# Patient Record
Sex: MALE | Race: WHITE | URBAN - METROPOLITAN AREA
[De-identification: names, ages, dates, MRNs, and addresses within clinical notes are randomized per-mention and may not be internally consistent; named-entity substitution may affect disease eponyms.]

---

## 2017-04-06 ENCOUNTER — EMERGENCY (EMERGENCY)
Facility: HOSPITAL | Age: 67
LOS: 1 days | Discharge: ANOTHER HOSPITAL | End: 2017-04-06
Attending: EMERGENCY MEDICINE | Admitting: EMERGENCY MEDICINE
Payer: MEDICARE

## 2017-04-06 ENCOUNTER — INPATIENT (INPATIENT)
Facility: HOSPITAL | Age: 67
LOS: 2 days | Discharge: HOME | DRG: 247 | End: 2017-04-09
Attending: STUDENT IN AN ORGANIZED HEALTH CARE EDUCATION/TRAINING PROGRAM | Admitting: INTERNAL MEDICINE
Payer: MEDICARE

## 2017-04-06 VITALS
WEIGHT: 300.05 LBS | HEIGHT: 69 IN | DIASTOLIC BLOOD PRESSURE: 95 MMHG | SYSTOLIC BLOOD PRESSURE: 150 MMHG | HEART RATE: 84 BPM | RESPIRATION RATE: 20 BRPM | TEMPERATURE: 98 F | OXYGEN SATURATION: 100 %

## 2017-04-06 VITALS — RESPIRATION RATE: 18 BRPM | HEART RATE: 99 BPM | WEIGHT: 292.99 LBS | HEIGHT: 69 IN | TEMPERATURE: 101 F

## 2017-04-06 VITALS
OXYGEN SATURATION: 100 % | RESPIRATION RATE: 19 BRPM | SYSTOLIC BLOOD PRESSURE: 123 MMHG | TEMPERATURE: 98 F | HEART RATE: 77 BPM | DIASTOLIC BLOOD PRESSURE: 83 MMHG

## 2017-04-06 DIAGNOSIS — I10 ESSENTIAL (PRIMARY) HYPERTENSION: ICD-10-CM

## 2017-04-06 DIAGNOSIS — I21.3 ST ELEVATION (STEMI) MYOCARDIAL INFARCTION OF UNSPECIFIED SITE: ICD-10-CM

## 2017-04-06 DIAGNOSIS — R07.9 CHEST PAIN, UNSPECIFIED: ICD-10-CM

## 2017-04-06 DIAGNOSIS — F32.9 MAJOR DEPRESSIVE DISORDER, SINGLE EPISODE, UNSPECIFIED: ICD-10-CM

## 2017-04-06 DIAGNOSIS — Z41.8 ENCOUNTER FOR OTHER PROCEDURES FOR PURPOSES OTHER THAN REMEDYING HEALTH STATE: ICD-10-CM

## 2017-04-06 DIAGNOSIS — I21.02 ST ELEVATION (STEMI) MYOCARDIAL INFARCTION INVOLVING LEFT ANTERIOR DESCENDING CORONARY ARTERY: ICD-10-CM

## 2017-04-06 DIAGNOSIS — R65.10 SYSTEMIC INFLAMMATORY RESPONSE SYNDROME (SIRS) OF NON-INFECTIOUS ORIGIN WITHOUT ACUTE ORGAN DYSFUNCTION: ICD-10-CM

## 2017-04-06 DIAGNOSIS — R07.89 OTHER CHEST PAIN: ICD-10-CM

## 2017-04-06 DIAGNOSIS — M10.9 GOUT, UNSPECIFIED: ICD-10-CM

## 2017-04-06 DIAGNOSIS — E11.9 TYPE 2 DIABETES MELLITUS WITHOUT COMPLICATIONS: ICD-10-CM

## 2017-04-06 DIAGNOSIS — I63.9 CEREBRAL INFARCTION, UNSPECIFIED: ICD-10-CM

## 2017-04-06 LAB
ALBUMIN SERPL ELPH-MCNC: 3.5 G/DL — SIGNIFICANT CHANGE UP (ref 3.3–5)
ALBUMIN SERPL ELPH-MCNC: 3.7 G/DL — SIGNIFICANT CHANGE UP (ref 3.3–5)
ALP SERPL-CCNC: 78 U/L — SIGNIFICANT CHANGE UP (ref 30–120)
ALP SERPL-CCNC: 84 U/L — SIGNIFICANT CHANGE UP (ref 40–120)
ALT FLD-CCNC: 40 U/L RC — SIGNIFICANT CHANGE UP (ref 10–45)
ALT FLD-CCNC: 53 U/L DA — SIGNIFICANT CHANGE UP (ref 10–60)
ANION GAP SERPL CALC-SCNC: 20 MMOL/L — HIGH (ref 5–17)
ANION GAP SERPL CALC-SCNC: 3 MMOL/L — LOW (ref 5–17)
APPEARANCE UR: CLEAR — SIGNIFICANT CHANGE UP
APTT BLD: 131.6 SEC — CRITICAL HIGH (ref 27.5–37.4)
APTT BLD: 33.1 SEC — SIGNIFICANT CHANGE UP (ref 27.5–37.4)
APTT BLD: 34.5 SEC — SIGNIFICANT CHANGE UP (ref 27.5–37.4)
AST SERPL-CCNC: 131 U/L — HIGH (ref 10–40)
AST SERPL-CCNC: 158 U/L — HIGH (ref 10–40)
BASOPHILS # BLD AUTO: 0 K/UL — SIGNIFICANT CHANGE UP (ref 0–0.2)
BASOPHILS # BLD AUTO: 0.2 K/UL — SIGNIFICANT CHANGE UP (ref 0–0.2)
BASOPHILS NFR BLD AUTO: 0 % — SIGNIFICANT CHANGE UP (ref 0–2)
BILIRUB SERPL-MCNC: 2.4 MG/DL — HIGH (ref 0.2–1.2)
BILIRUB SERPL-MCNC: 2.4 MG/DL — HIGH (ref 0.2–1.2)
BILIRUB UR-MCNC: NEGATIVE — SIGNIFICANT CHANGE UP
BUN SERPL-MCNC: 15 MG/DL — SIGNIFICANT CHANGE UP (ref 7–23)
BUN SERPL-MCNC: 17 MG/DL — SIGNIFICANT CHANGE UP (ref 7–23)
CALCIUM SERPL-MCNC: 9.1 MG/DL — SIGNIFICANT CHANGE UP (ref 8.4–10.5)
CALCIUM SERPL-MCNC: 9.4 MG/DL — SIGNIFICANT CHANGE UP (ref 8.4–10.5)
CHLORIDE SERPL-SCNC: 97 MMOL/L — SIGNIFICANT CHANGE UP (ref 96–108)
CHLORIDE SERPL-SCNC: 98 MMOL/L — SIGNIFICANT CHANGE UP (ref 96–108)
CK MB BLD-MCNC: 7 % — HIGH (ref 0–3.5)
CK MB BLD-MCNC: 7.4 % — HIGH (ref 0–3.5)
CK MB CFR SERPL CALC: 42 NG/ML — HIGH (ref 0–3.6)
CK MB CFR SERPL CALC: 52 NG/ML — HIGH (ref 0–6.7)
CK SERPL-CCNC: 571 U/L — HIGH (ref 39–308)
CK SERPL-CCNC: 746 U/L — HIGH (ref 30–200)
CO2 SERPL-SCNC: 20 MMOL/L — LOW (ref 22–31)
CO2 SERPL-SCNC: 33 MMOL/L — HIGH (ref 22–31)
COLOR SPEC: COLORLESS — SIGNIFICANT CHANGE UP
CREAT SERPL-MCNC: 0.93 MG/DL — SIGNIFICANT CHANGE UP (ref 0.5–1.3)
CREAT SERPL-MCNC: 1.09 MG/DL — SIGNIFICANT CHANGE UP (ref 0.5–1.3)
DIFF PNL FLD: ABNORMAL
EOSINOPHIL # BLD AUTO: 0 K/UL — SIGNIFICANT CHANGE UP (ref 0–0.5)
EOSINOPHIL # BLD AUTO: 0.1 K/UL — SIGNIFICANT CHANGE UP (ref 0–0.5)
EOSINOPHIL NFR BLD AUTO: 0.1 % — SIGNIFICANT CHANGE UP (ref 0–6)
EOSINOPHIL NFR BLD AUTO: 1 % — SIGNIFICANT CHANGE UP (ref 0–6)
GLUCOSE SERPL-MCNC: 126 MG/DL — HIGH (ref 70–99)
GLUCOSE SERPL-MCNC: 135 MG/DL — HIGH (ref 70–99)
GLUCOSE UR QL: 1000
HBA1C BLD-MCNC: 7.6 % — HIGH (ref 4–5.6)
HCT VFR BLD CALC: 47.7 % — SIGNIFICANT CHANGE UP (ref 39–50)
HCT VFR BLD CALC: 51.3 % — HIGH (ref 39–50)
HGB BLD-MCNC: 16.1 G/DL — SIGNIFICANT CHANGE UP (ref 13–17)
HGB BLD-MCNC: 16.2 G/DL — SIGNIFICANT CHANGE UP (ref 13–17)
INR BLD: 1.29 RATIO — HIGH (ref 0.88–1.16)
INR BLD: 1.34 RATIO — HIGH (ref 0.88–1.16)
KETONES UR-MCNC: ABNORMAL
LEUKOCYTE ESTERASE UR-ACNC: NEGATIVE — SIGNIFICANT CHANGE UP
LYMPHOCYTES # BLD AUTO: 1.8 K/UL — SIGNIFICANT CHANGE UP (ref 1–3.3)
LYMPHOCYTES # BLD AUTO: 11.6 % — LOW (ref 13–44)
LYMPHOCYTES # BLD AUTO: 17 % — SIGNIFICANT CHANGE UP (ref 13–44)
LYMPHOCYTES # BLD AUTO: 2.9 K/UL — SIGNIFICANT CHANGE UP (ref 1–3.3)
MAGNESIUM SERPL-MCNC: 1.9 MG/DL — SIGNIFICANT CHANGE UP (ref 1.6–2.6)
MCHC RBC-ENTMCNC: 29.1 PG — SIGNIFICANT CHANGE UP (ref 27–34)
MCHC RBC-ENTMCNC: 30.2 PG — SIGNIFICANT CHANGE UP (ref 27–34)
MCHC RBC-ENTMCNC: 31.6 GM/DL — LOW (ref 32–36)
MCHC RBC-ENTMCNC: 33.7 GM/DL — SIGNIFICANT CHANGE UP (ref 32–36)
MCV RBC AUTO: 89.7 FL — SIGNIFICANT CHANGE UP (ref 80–100)
MCV RBC AUTO: 92 FL — SIGNIFICANT CHANGE UP (ref 80–100)
MONOCYTES # BLD AUTO: 1.4 K/UL — HIGH (ref 0–0.9)
MONOCYTES # BLD AUTO: 1.6 K/UL — HIGH (ref 0–0.9)
MONOCYTES NFR BLD AUTO: 7 % — SIGNIFICANT CHANGE UP (ref 2–14)
MONOCYTES NFR BLD AUTO: 8.5 % — SIGNIFICANT CHANGE UP (ref 2–14)
NEUTROPHILS # BLD AUTO: 10.1 K/UL — HIGH (ref 1.8–7.4)
NEUTROPHILS # BLD AUTO: 12.8 K/UL — HIGH (ref 1.8–7.4)
NEUTROPHILS NFR BLD AUTO: 75 % — SIGNIFICANT CHANGE UP (ref 43–77)
NEUTROPHILS NFR BLD AUTO: 79.8 % — HIGH (ref 43–77)
NITRITE UR-MCNC: NEGATIVE — SIGNIFICANT CHANGE UP
NT-PROBNP SERPL-SCNC: 3457 PG/ML — HIGH (ref 0–300)
PH UR: 6.5 — SIGNIFICANT CHANGE UP (ref 4.8–8)
PHOSPHATE SERPL-MCNC: 2.5 MG/DL — SIGNIFICANT CHANGE UP (ref 2.5–4.5)
PLATELET # BLD AUTO: 163 K/UL — SIGNIFICANT CHANGE UP (ref 150–400)
PLATELET # BLD AUTO: 178 K/UL — SIGNIFICANT CHANGE UP (ref 150–400)
POTASSIUM SERPL-MCNC: 3.9 MMOL/L — SIGNIFICANT CHANGE UP (ref 3.5–5.3)
POTASSIUM SERPL-MCNC: 4.2 MMOL/L — SIGNIFICANT CHANGE UP (ref 3.5–5.3)
POTASSIUM SERPL-SCNC: 3.9 MMOL/L — SIGNIFICANT CHANGE UP (ref 3.5–5.3)
POTASSIUM SERPL-SCNC: 4.2 MMOL/L — SIGNIFICANT CHANGE UP (ref 3.5–5.3)
PROT SERPL-MCNC: 7.4 G/DL — SIGNIFICANT CHANGE UP (ref 6–8.3)
PROT SERPL-MCNC: 7.6 G/DL — SIGNIFICANT CHANGE UP (ref 6–8.3)
PROT UR-MCNC: NEGATIVE — SIGNIFICANT CHANGE UP
PROTHROM AB SERPL-ACNC: 14.1 SEC — HIGH (ref 9.8–12.7)
PROTHROM AB SERPL-ACNC: 14.7 SEC — HIGH (ref 9.8–12.7)
RAPID RVP RESULT: SIGNIFICANT CHANGE UP
RBC # BLD: 5.32 M/UL — SIGNIFICANT CHANGE UP (ref 4.2–5.8)
RBC # BLD: 5.58 M/UL — SIGNIFICANT CHANGE UP (ref 4.2–5.8)
RBC # FLD: 13.8 % — SIGNIFICANT CHANGE UP (ref 10.3–14.5)
RBC # FLD: 13.9 % — SIGNIFICANT CHANGE UP (ref 10.3–14.5)
SODIUM SERPL-SCNC: 134 MMOL/L — LOW (ref 135–145)
SODIUM SERPL-SCNC: 137 MMOL/L — SIGNIFICANT CHANGE UP (ref 135–145)
SP GR SPEC: >1.03 — HIGH (ref 1.01–1.02)
TROPONIN I SERPL-MCNC: 18.49 NG/ML — HIGH (ref 0.02–0.06)
TROPONIN T SERPL-MCNC: 5.37 NG/ML — HIGH (ref 0–0.06)
TSH SERPL-MCNC: 2.45 UIU/ML — SIGNIFICANT CHANGE UP (ref 0.27–4.2)
UROBILINOGEN FLD QL: NEGATIVE — SIGNIFICANT CHANGE UP
WBC # BLD: 14.7 K/UL — HIGH (ref 3.8–10.5)
WBC # BLD: 16 K/UL — HIGH (ref 3.8–10.5)
WBC # FLD AUTO: 14.7 K/UL — HIGH (ref 3.8–10.5)
WBC # FLD AUTO: 16 K/UL — HIGH (ref 3.8–10.5)

## 2017-04-06 PROCEDURE — 99285 EMERGENCY DEPT VISIT HI MDM: CPT

## 2017-04-06 PROCEDURE — 71010: CPT | Mod: 26,77

## 2017-04-06 PROCEDURE — 71010: CPT | Mod: 26

## 2017-04-06 PROCEDURE — 92941 PRQ TRLML REVSC TOT OCCL AMI: CPT | Mod: LD,GC

## 2017-04-06 PROCEDURE — 93567 NJX CAR CTH SPRVLV AORTGRPHY: CPT | Mod: GC

## 2017-04-06 PROCEDURE — 93010 ELECTROCARDIOGRAM REPORT: CPT

## 2017-04-06 PROCEDURE — 93458 L HRT ARTERY/VENTRICLE ANGIO: CPT | Mod: 26,59,GC

## 2017-04-06 RX ORDER — ATORVASTATIN CALCIUM 80 MG/1
80 TABLET, FILM COATED ORAL AT BEDTIME
Qty: 0 | Refills: 0 | Status: DISCONTINUED | OUTPATIENT
Start: 2017-04-06 | End: 2017-04-09

## 2017-04-06 RX ORDER — HEPARIN SODIUM 5000 [USP'U]/ML
5000 INJECTION INTRAVENOUS; SUBCUTANEOUS ONCE
Qty: 0 | Refills: 0 | Status: COMPLETED | OUTPATIENT
Start: 2017-04-06 | End: 2017-04-06

## 2017-04-06 RX ORDER — SODIUM CHLORIDE 9 MG/ML
1000 INJECTION, SOLUTION INTRAVENOUS
Qty: 0 | Refills: 0 | Status: DISCONTINUED | OUTPATIENT
Start: 2017-04-06 | End: 2017-04-09

## 2017-04-06 RX ORDER — FUROSEMIDE 40 MG
40 TABLET ORAL ONCE
Qty: 0 | Refills: 0 | Status: COMPLETED | OUTPATIENT
Start: 2017-04-06 | End: 2017-04-06

## 2017-04-06 RX ORDER — ALLOPURINOL 300 MG
300 TABLET ORAL DAILY
Qty: 0 | Refills: 0 | Status: DISCONTINUED | OUTPATIENT
Start: 2017-04-06 | End: 2017-04-09

## 2017-04-06 RX ORDER — HEPARIN SODIUM 5000 [USP'U]/ML
5000 INJECTION INTRAVENOUS; SUBCUTANEOUS ONCE
Qty: 0 | Refills: 0 | Status: DISCONTINUED | OUTPATIENT
Start: 2017-04-06 | End: 2017-04-06

## 2017-04-06 RX ORDER — INSULIN LISPRO 100/ML
VIAL (ML) SUBCUTANEOUS AT BEDTIME
Qty: 0 | Refills: 0 | Status: DISCONTINUED | OUTPATIENT
Start: 2017-04-06 | End: 2017-04-09

## 2017-04-06 RX ORDER — ESCITALOPRAM OXALATE 10 MG/1
20 TABLET, FILM COATED ORAL DAILY
Qty: 0 | Refills: 0 | Status: DISCONTINUED | OUTPATIENT
Start: 2017-04-06 | End: 2017-04-09

## 2017-04-06 RX ORDER — ASPIRIN/CALCIUM CARB/MAGNESIUM 324 MG
325 TABLET ORAL ONCE
Qty: 0 | Refills: 0 | Status: COMPLETED | OUTPATIENT
Start: 2017-04-06 | End: 2017-04-06

## 2017-04-06 RX ORDER — DEXTROSE 50 % IN WATER 50 %
25 SYRINGE (ML) INTRAVENOUS ONCE
Qty: 0 | Refills: 0 | Status: DISCONTINUED | OUTPATIENT
Start: 2017-04-06 | End: 2017-04-09

## 2017-04-06 RX ORDER — HEPARIN SODIUM 5000 [USP'U]/ML
INJECTION INTRAVENOUS; SUBCUTANEOUS
Qty: 25000 | Refills: 0 | Status: DISCONTINUED | OUTPATIENT
Start: 2017-04-06 | End: 2017-04-10

## 2017-04-06 RX ORDER — DEXTROSE 50 % IN WATER 50 %
12.5 SYRINGE (ML) INTRAVENOUS ONCE
Qty: 0 | Refills: 0 | Status: DISCONTINUED | OUTPATIENT
Start: 2017-04-06 | End: 2017-04-09

## 2017-04-06 RX ORDER — CLOPIDOGREL BISULFATE 75 MG/1
75 TABLET, FILM COATED ORAL DAILY
Qty: 0 | Refills: 0 | Status: DISCONTINUED | OUTPATIENT
Start: 2017-04-07 | End: 2017-04-09

## 2017-04-06 RX ORDER — INSULIN LISPRO 100/ML
VIAL (ML) SUBCUTANEOUS
Qty: 0 | Refills: 0 | Status: DISCONTINUED | OUTPATIENT
Start: 2017-04-06 | End: 2017-04-09

## 2017-04-06 RX ORDER — MAGNESIUM SULFATE 500 MG/ML
1 VIAL (ML) INJECTION ONCE
Qty: 0 | Refills: 0 | Status: COMPLETED | OUTPATIENT
Start: 2017-04-06 | End: 2017-04-06

## 2017-04-06 RX ORDER — LISINOPRIL 2.5 MG/1
5 TABLET ORAL DAILY
Qty: 0 | Refills: 0 | Status: DISCONTINUED | OUTPATIENT
Start: 2017-04-06 | End: 2017-04-07

## 2017-04-06 RX ORDER — RIVASTIGMINE 4.6 MG/24H
9.5 PATCH, EXTENDED RELEASE TRANSDERMAL
Qty: 0 | Refills: 0 | COMMUNITY

## 2017-04-06 RX ORDER — PANTOPRAZOLE SODIUM 20 MG/1
40 TABLET, DELAYED RELEASE ORAL
Qty: 0 | Refills: 0 | Status: DISCONTINUED | OUTPATIENT
Start: 2017-04-06 | End: 2017-04-09

## 2017-04-06 RX ORDER — GLUCAGON INJECTION, SOLUTION 0.5 MG/.1ML
1 INJECTION, SOLUTION SUBCUTANEOUS ONCE
Qty: 0 | Refills: 0 | Status: DISCONTINUED | OUTPATIENT
Start: 2017-04-06 | End: 2017-04-09

## 2017-04-06 RX ORDER — ASPIRIN/CALCIUM CARB/MAGNESIUM 324 MG
81 TABLET ORAL DAILY
Qty: 0 | Refills: 0 | Status: DISCONTINUED | OUTPATIENT
Start: 2017-04-07 | End: 2017-04-09

## 2017-04-06 RX ORDER — DEXTROSE 50 % IN WATER 50 %
1 SYRINGE (ML) INTRAVENOUS ONCE
Qty: 0 | Refills: 0 | Status: DISCONTINUED | OUTPATIENT
Start: 2017-04-06 | End: 2017-04-09

## 2017-04-06 RX ORDER — HEPARIN SODIUM 5000 [USP'U]/ML
5000 INJECTION INTRAVENOUS; SUBCUTANEOUS EVERY 12 HOURS
Qty: 0 | Refills: 0 | Status: DISCONTINUED | OUTPATIENT
Start: 2017-04-06 | End: 2017-04-09

## 2017-04-06 RX ORDER — CLOPIDOGREL BISULFATE 75 MG/1
600 TABLET, FILM COATED ORAL ONCE
Qty: 0 | Refills: 0 | Status: COMPLETED | OUTPATIENT
Start: 2017-04-06 | End: 2017-04-06

## 2017-04-06 RX ORDER — GABAPENTIN 400 MG/1
300 CAPSULE ORAL
Qty: 0 | Refills: 0 | Status: DISCONTINUED | OUTPATIENT
Start: 2017-04-06 | End: 2017-04-09

## 2017-04-06 RX ADMIN — ATORVASTATIN CALCIUM 80 MILLIGRAM(S): 80 TABLET, FILM COATED ORAL at 21:24

## 2017-04-06 RX ADMIN — HEPARIN SODIUM 5000 UNIT(S): 5000 INJECTION INTRAVENOUS; SUBCUTANEOUS at 12:16

## 2017-04-06 RX ADMIN — Medication 100 GRAM(S): at 21:33

## 2017-04-06 RX ADMIN — Medication 40 MILLIGRAM(S): at 20:15

## 2017-04-06 RX ADMIN — HEPARIN SODIUM 5000 UNIT(S): 5000 INJECTION INTRAVENOUS; SUBCUTANEOUS at 23:10

## 2017-04-06 RX ADMIN — Medication 325 MILLIGRAM(S): at 12:03

## 2017-04-06 RX ADMIN — LISINOPRIL 5 MILLIGRAM(S): 2.5 TABLET ORAL at 16:06

## 2017-04-06 RX ADMIN — CLOPIDOGREL BISULFATE 600 MILLIGRAM(S): 75 TABLET, FILM COATED ORAL at 12:03

## 2017-04-06 RX ADMIN — Medication 300 MILLIGRAM(S): at 21:23

## 2017-04-06 RX ADMIN — GABAPENTIN 300 MILLIGRAM(S): 400 CAPSULE ORAL at 18:32

## 2017-04-06 RX ADMIN — PANTOPRAZOLE SODIUM 40 MILLIGRAM(S): 20 TABLET, DELAYED RELEASE ORAL at 16:06

## 2017-04-06 NOTE — H&P ADULT. - PROBLEM SELECTOR PLAN 4
- F/u A1C  - Pt not on insulin. will hold oral agents. Start ISS.   - Monitor FS premeal and before bed  - CC diet

## 2017-04-06 NOTE — H&P ADULT. - ASSESSMENT
67M with a PMH of HTN, DM2, HLD, CVA (at 51) that presents with substernal chest pain found to have LUTHER on EKG. Transferred for cardiac cath. Found to have 100% LAD lesion now s/p 3 KEITH to Mid to Distal LAD.

## 2017-04-06 NOTE — H&P ADULT. - HISTORY OF PRESENT ILLNESS
Pt is a 67 year old M with a PMH of HTN, BPH, DM2, HLD, depression, CVA (at 51) reoprtadly c/b memory loss w/ no physical deficits, gout, and remote hx of appendicitis c/b peritonitis requiring multiple surgeries, and remote hx of psoriasis that presents with substernal burning / crushing chest pain. He states that the pain started the night before presentation. It came on suddenly after eating, so he thought it was heartburn. The next day his son saw him and urged him to go to the doctor. He went to his PCP, who reportedly took a ECG and then told him to go to the ER immediately. He was taken to [place] ED. ECG was performed and showed approximately 3 mm LUTHER in V1, V2, and V3. Q waves were also noted in II, III, and aVF. He was given ASA, Plavix and heparin. Cardiac enzymes were positive. The patient was transferred to [facility] for cardiac cath for STEMI.     Pt was taken to the cath lab on arrival. He was found to have LVEDP of 25, preserved EF, and 100% mid LAD lesion. He received 40 of IV Lasix x 1 and 2 KEITH to the Mid to Distal LAD. (Full cath report Pending)    Of note, the patient states that he was feeling sick a few days prior to presentation. He states that he was having muscle aches, runny nose, and cough. As far as he know he was not having any fevers. He did have some sick contacts. He has no dysuria, no diarrhea, no abd pain, and no headache. He does admit to having chills the past few days.     His initial vitals on arrival to the CCU were: 101.0, 99, 152/78, 18, 98% on 3 L NC.

## 2017-04-06 NOTE — ED PROVIDER NOTE - OBJECTIVE STATEMENT
66 y/o M pt with history of HTN, depression, diabetes mellitus presents to the ED c/o chest pain that started 2 days ago. Pt states that originally it felt like "burning" so he began to take antacids, but now the burning is gone and there is just pain on his left side under his arm, which began last night. Pt saw Dr. Zeng and he did an EKG, which was inconclusive, and sent him to the ED. Pt states that over 20 years ago his heart stopped, and he has had 4 strokes, his last one was in 2004. Denies fever, cough, shortness of breath. No further complaints at this time.

## 2017-04-06 NOTE — H&P ADULT. - PROBLEM SELECTOR PLAN 3
- Pts BP currently elevated.   - Will start ACE. with plan to get on coreg and lisinopril.   - Pt was on HCTZ at home. will hold this for now.  - Low Salt Diet.

## 2017-04-06 NOTE — H&P ADULT. - PROBLEM SELECTOR PLAN 1
- Pt found to have 100% lesion in LAD now s/p KEITH x 3.   - DAPT started with ASA and Plavix with plan to continue for at least 1 year.   - Atorvastatin 80 daily started.   - Pts blood pressure able tolerate ACEi at this time. will start lisinopril 5.   - Consider BB in AM.  - Monitor in CCU on tele.   - Echo in 72 hours to r/o LV thrombus and evaluate EF (EF on ventriculogram preserved)  - Monitor fluid status (pt has B/l rales and elevated LVEDP) dose lasix as needed for now. Pt urinating well right now.  - Strict Is and Os

## 2017-04-06 NOTE — PROVIDER CONTACT NOTE (CRITICAL VALUE NOTIFICATION) - ACTION/TREATMENT ORDERED:
As per MD number will trend down, will revaluate with next blood draw. No action ordered at this time

## 2017-04-06 NOTE — ED ADULT NURSE NOTE - OBJECTIVE STATEMENT
Pt reported sent in by his PMD for chest pain x 3 days now.. Pt is A/Ox4 ambulatory. Pt  complains of left sided chest pain started 3 days ago. Pt is comfortable looking but anxious. No respiratory distress noted.

## 2017-04-06 NOTE — H&P ADULT. - PROBLEM SELECTOR PLAN 2
- Pt found to have fever in CCU. Blood cultures, ua, and RVP sent.   - Low threshold for abx with likely resp cause given symptoms a few days PTA  - CXR shows no focal consolidations.   - Fever may be do to MI as well will monitor.

## 2017-04-06 NOTE — H&P ADULT. - FAMILY HISTORY
Father  Still living? Unknown  Family history of schizophrenia, Age at diagnosis: Age Unknown  Family history of emphysema, Age at diagnosis: Age Unknown

## 2017-04-06 NOTE — ED PROVIDER NOTE - NS ED MD SCRIBE ATTENDING SCRIBE SECTIONS
PHYSICAL EXAM/PAST MEDICAL/SURGICAL/SOCIAL HISTORY/DISPOSITION/HISTORY OF PRESENT ILLNESS/REVIEW OF SYSTEMS/VITAL SIGNS( Pullset)

## 2017-04-06 NOTE — ED PROVIDER NOTE - PMH
Depression    Gout    HTN - Hypertension    AKOSUA (Obstructive Sleep Apnea)    Personal History of CVA (Cerebrovascular Accident)

## 2017-04-07 LAB
ALBUMIN SERPL ELPH-MCNC: 3.4 G/DL — SIGNIFICANT CHANGE UP (ref 3.3–5)
ALBUMIN SERPL ELPH-MCNC: 3.5 G/DL — SIGNIFICANT CHANGE UP (ref 3.3–5)
ALBUMIN SERPL ELPH-MCNC: 3.7 G/DL — SIGNIFICANT CHANGE UP (ref 3.3–5)
ALP SERPL-CCNC: 65 U/L — SIGNIFICANT CHANGE UP (ref 40–120)
ALP SERPL-CCNC: 72 U/L — SIGNIFICANT CHANGE UP (ref 40–120)
ALP SERPL-CCNC: 75 U/L — SIGNIFICANT CHANGE UP (ref 40–120)
ALT FLD-CCNC: 30 U/L RC — SIGNIFICANT CHANGE UP (ref 10–45)
ALT FLD-CCNC: 33 U/L RC — SIGNIFICANT CHANGE UP (ref 10–45)
ALT FLD-CCNC: 37 U/L RC — SIGNIFICANT CHANGE UP (ref 10–45)
ANION GAP SERPL CALC-SCNC: 14 MMOL/L — SIGNIFICANT CHANGE UP (ref 5–17)
ANION GAP SERPL CALC-SCNC: 16 MMOL/L — SIGNIFICANT CHANGE UP (ref 5–17)
ANION GAP SERPL CALC-SCNC: 19 MMOL/L — HIGH (ref 5–17)
APTT BLD: 32.4 SEC — SIGNIFICANT CHANGE UP (ref 27.5–37.4)
APTT BLD: 36.3 SEC — SIGNIFICANT CHANGE UP (ref 27.5–37.4)
AST SERPL-CCNC: 109 U/L — HIGH (ref 10–40)
AST SERPL-CCNC: 116 U/L — HIGH (ref 10–40)
AST SERPL-CCNC: 72 U/L — HIGH (ref 10–40)
BASOPHILS # BLD AUTO: 0.1 K/UL — SIGNIFICANT CHANGE UP (ref 0–0.2)
BASOPHILS NFR BLD AUTO: 0.4 % — SIGNIFICANT CHANGE UP (ref 0–2)
BILIRUB SERPL-MCNC: 1.4 MG/DL — HIGH (ref 0.2–1.2)
BILIRUB SERPL-MCNC: 2 MG/DL — HIGH (ref 0.2–1.2)
BILIRUB SERPL-MCNC: 2 MG/DL — HIGH (ref 0.2–1.2)
BLD GP AB SCN SERPL QL: NEGATIVE — SIGNIFICANT CHANGE UP
BUN SERPL-MCNC: 20 MG/DL — SIGNIFICANT CHANGE UP (ref 7–23)
BUN SERPL-MCNC: 21 MG/DL — SIGNIFICANT CHANGE UP (ref 7–23)
BUN SERPL-MCNC: 34 MG/DL — HIGH (ref 7–23)
CALCIUM SERPL-MCNC: 8.9 MG/DL — SIGNIFICANT CHANGE UP (ref 8.4–10.5)
CALCIUM SERPL-MCNC: 9 MG/DL — SIGNIFICANT CHANGE UP (ref 8.4–10.5)
CALCIUM SERPL-MCNC: 9.1 MG/DL — SIGNIFICANT CHANGE UP (ref 8.4–10.5)
CHLORIDE SERPL-SCNC: 100 MMOL/L — SIGNIFICANT CHANGE UP (ref 96–108)
CHLORIDE SERPL-SCNC: 102 MMOL/L — SIGNIFICANT CHANGE UP (ref 96–108)
CHLORIDE SERPL-SCNC: 98 MMOL/L — SIGNIFICANT CHANGE UP (ref 96–108)
CHOLEST SERPL-MCNC: 87 MG/DL — SIGNIFICANT CHANGE UP (ref 10–199)
CK MB BLD-MCNC: 4.9 % — HIGH (ref 0–3.5)
CK MB BLD-MCNC: 5.1 % — HIGH (ref 0–3.5)
CK MB CFR SERPL CALC: 25.6 NG/ML — HIGH (ref 0–6.7)
CK MB CFR SERPL CALC: 29.6 NG/ML — HIGH (ref 0–6.7)
CK SERPL-CCNC: 518 U/L — HIGH (ref 30–200)
CK SERPL-CCNC: 580 U/L — HIGH (ref 30–200)
CO2 SERPL-SCNC: 23 MMOL/L — SIGNIFICANT CHANGE UP (ref 22–31)
CO2 SERPL-SCNC: 23 MMOL/L — SIGNIFICANT CHANGE UP (ref 22–31)
CO2 SERPL-SCNC: 27 MMOL/L — SIGNIFICANT CHANGE UP (ref 22–31)
CREAT SERPL-MCNC: 1.44 MG/DL — HIGH (ref 0.5–1.3)
CREAT SERPL-MCNC: 1.54 MG/DL — HIGH (ref 0.5–1.3)
CREAT SERPL-MCNC: 1.73 MG/DL — HIGH (ref 0.5–1.3)
EOSINOPHIL # BLD AUTO: 0 K/UL — SIGNIFICANT CHANGE UP (ref 0–0.5)
EOSINOPHIL NFR BLD AUTO: 0.1 % — SIGNIFICANT CHANGE UP (ref 0–6)
GAS PNL BLDV: SIGNIFICANT CHANGE UP
GLUCOSE SERPL-MCNC: 121 MG/DL — HIGH (ref 70–99)
GLUCOSE SERPL-MCNC: 141 MG/DL — HIGH (ref 70–99)
GLUCOSE SERPL-MCNC: 144 MG/DL — HIGH (ref 70–99)
HCT VFR BLD CALC: 46.6 % — SIGNIFICANT CHANGE UP (ref 39–50)
HCT VFR BLD CALC: 47 % — SIGNIFICANT CHANGE UP (ref 39–50)
HDLC SERPL-MCNC: 34 MG/DL — LOW (ref 40–125)
HGB BLD-MCNC: 15.6 G/DL — SIGNIFICANT CHANGE UP (ref 13–17)
HGB BLD-MCNC: 15.8 G/DL — SIGNIFICANT CHANGE UP (ref 13–17)
INR BLD: 1.29 RATIO — HIGH (ref 0.88–1.16)
INR BLD: 1.3 RATIO — HIGH (ref 0.88–1.16)
LIPID PNL WITH DIRECT LDL SERPL: 44 MG/DL — SIGNIFICANT CHANGE UP
LYMPHOCYTES # BLD AUTO: 15.7 % — SIGNIFICANT CHANGE UP (ref 13–44)
LYMPHOCYTES # BLD AUTO: 2.3 K/UL — SIGNIFICANT CHANGE UP (ref 1–3.3)
MAGNESIUM SERPL-MCNC: 2.4 MG/DL — SIGNIFICANT CHANGE UP (ref 1.6–2.6)
MAGNESIUM SERPL-MCNC: 2.5 MG/DL — SIGNIFICANT CHANGE UP (ref 1.6–2.6)
MCHC RBC-ENTMCNC: 30.2 PG — SIGNIFICANT CHANGE UP (ref 27–34)
MCHC RBC-ENTMCNC: 30.2 PG — SIGNIFICANT CHANGE UP (ref 27–34)
MCHC RBC-ENTMCNC: 33.4 GM/DL — SIGNIFICANT CHANGE UP (ref 32–36)
MCHC RBC-ENTMCNC: 33.6 GM/DL — SIGNIFICANT CHANGE UP (ref 32–36)
MCV RBC AUTO: 89.9 FL — SIGNIFICANT CHANGE UP (ref 80–100)
MCV RBC AUTO: 90.2 FL — SIGNIFICANT CHANGE UP (ref 80–100)
MONOCYTES # BLD AUTO: 1.9 K/UL — HIGH (ref 0–0.9)
MONOCYTES NFR BLD AUTO: 12.9 % — SIGNIFICANT CHANGE UP (ref 2–14)
NEUTROPHILS # BLD AUTO: 10.5 K/UL — HIGH (ref 1.8–7.4)
NEUTROPHILS NFR BLD AUTO: 70.9 % — SIGNIFICANT CHANGE UP (ref 43–77)
PHOSPHATE SERPL-MCNC: 3.9 MG/DL — SIGNIFICANT CHANGE UP (ref 2.5–4.5)
PHOSPHATE SERPL-MCNC: 4.1 MG/DL — SIGNIFICANT CHANGE UP (ref 2.5–4.5)
PLATELET # BLD AUTO: 155 K/UL — SIGNIFICANT CHANGE UP (ref 150–400)
PLATELET # BLD AUTO: 159 K/UL — SIGNIFICANT CHANGE UP (ref 150–400)
POTASSIUM SERPL-MCNC: 3.9 MMOL/L — SIGNIFICANT CHANGE UP (ref 3.5–5.3)
POTASSIUM SERPL-MCNC: 4.2 MMOL/L — SIGNIFICANT CHANGE UP (ref 3.5–5.3)
POTASSIUM SERPL-MCNC: 4.2 MMOL/L — SIGNIFICANT CHANGE UP (ref 3.5–5.3)
POTASSIUM SERPL-SCNC: 3.9 MMOL/L — SIGNIFICANT CHANGE UP (ref 3.5–5.3)
POTASSIUM SERPL-SCNC: 4.2 MMOL/L — SIGNIFICANT CHANGE UP (ref 3.5–5.3)
POTASSIUM SERPL-SCNC: 4.2 MMOL/L — SIGNIFICANT CHANGE UP (ref 3.5–5.3)
PROT SERPL-MCNC: 6.8 G/DL — SIGNIFICANT CHANGE UP (ref 6–8.3)
PROT SERPL-MCNC: 7 G/DL — SIGNIFICANT CHANGE UP (ref 6–8.3)
PROT SERPL-MCNC: 7.4 G/DL — SIGNIFICANT CHANGE UP (ref 6–8.3)
PROTHROM AB SERPL-ACNC: 14.1 SEC — HIGH (ref 9.8–12.7)
PROTHROM AB SERPL-ACNC: 14.2 SEC — HIGH (ref 9.8–12.7)
RBC # BLD: 5.16 M/UL — SIGNIFICANT CHANGE UP (ref 4.2–5.8)
RBC # BLD: 5.23 M/UL — SIGNIFICANT CHANGE UP (ref 4.2–5.8)
RBC # FLD: 13.8 % — SIGNIFICANT CHANGE UP (ref 10.3–14.5)
RBC # FLD: 13.8 % — SIGNIFICANT CHANGE UP (ref 10.3–14.5)
RH IG SCN BLD-IMP: POSITIVE — SIGNIFICANT CHANGE UP
SODIUM SERPL-SCNC: 137 MMOL/L — SIGNIFICANT CHANGE UP (ref 135–145)
SODIUM SERPL-SCNC: 142 MMOL/L — SIGNIFICANT CHANGE UP (ref 135–145)
SODIUM SERPL-SCNC: 143 MMOL/L — SIGNIFICANT CHANGE UP (ref 135–145)
TOTAL CHOLESTEROL/HDL RATIO MEASUREMENT: 2.6 RATIO — LOW (ref 3.4–9.6)
TRIGL SERPL-MCNC: 46 MG/DL — SIGNIFICANT CHANGE UP (ref 10–149)
TROPONIN T SERPL-MCNC: 4.57 NG/ML — HIGH (ref 0–0.06)
TROPONIN T SERPL-MCNC: 4.8 NG/ML — HIGH (ref 0–0.06)
WBC # BLD: 14.7 K/UL — HIGH (ref 3.8–10.5)
WBC # BLD: 16.1 K/UL — HIGH (ref 3.8–10.5)
WBC # FLD AUTO: 14.7 K/UL — HIGH (ref 3.8–10.5)
WBC # FLD AUTO: 16.1 K/UL — HIGH (ref 3.8–10.5)

## 2017-04-07 PROCEDURE — 99233 SBSQ HOSP IP/OBS HIGH 50: CPT | Mod: GC

## 2017-04-07 PROCEDURE — 93321 DOPPLER ECHO F-UP/LMTD STD: CPT | Mod: 26,59

## 2017-04-07 PROCEDURE — 93010 ELECTROCARDIOGRAM REPORT: CPT

## 2017-04-07 PROCEDURE — 93306 TTE W/DOPPLER COMPLETE: CPT | Mod: 26

## 2017-04-07 PROCEDURE — 93308 TTE F-UP OR LMTD: CPT | Mod: 26,59

## 2017-04-07 RX ORDER — RIVASTIGMINE 4.6 MG/24H
1 PATCH, EXTENDED RELEASE TRANSDERMAL EVERY 24 HOURS
Qty: 0 | Refills: 0 | Status: DISCONTINUED | OUTPATIENT
Start: 2017-04-07 | End: 2017-04-09

## 2017-04-07 RX ORDER — SODIUM CHLORIDE 9 MG/ML
500 INJECTION INTRAMUSCULAR; INTRAVENOUS; SUBCUTANEOUS ONCE
Qty: 0 | Refills: 0 | Status: COMPLETED | OUTPATIENT
Start: 2017-04-07 | End: 2017-04-07

## 2017-04-07 RX ORDER — PIPERACILLIN AND TAZOBACTAM 4; .5 G/20ML; G/20ML
3.38 INJECTION, POWDER, LYOPHILIZED, FOR SOLUTION INTRAVENOUS ONCE
Qty: 0 | Refills: 0 | Status: COMPLETED | OUTPATIENT
Start: 2017-04-07 | End: 2017-04-07

## 2017-04-07 RX ORDER — TAMSULOSIN HYDROCHLORIDE 0.4 MG/1
0.4 CAPSULE ORAL AT BEDTIME
Qty: 0 | Refills: 0 | Status: DISCONTINUED | OUTPATIENT
Start: 2017-04-07 | End: 2017-04-09

## 2017-04-07 RX ORDER — VANCOMYCIN HCL 1 G
1000 VIAL (EA) INTRAVENOUS ONCE
Qty: 0 | Refills: 0 | Status: COMPLETED | OUTPATIENT
Start: 2017-04-07 | End: 2017-04-07

## 2017-04-07 RX ORDER — SODIUM CHLORIDE 9 MG/ML
250 INJECTION INTRAMUSCULAR; INTRAVENOUS; SUBCUTANEOUS
Qty: 0 | Refills: 0 | Status: DISCONTINUED | OUTPATIENT
Start: 2017-04-07 | End: 2017-04-07

## 2017-04-07 RX ADMIN — Medication 300 MILLIGRAM(S): at 13:10

## 2017-04-07 RX ADMIN — SODIUM CHLORIDE 250 MILLILITER(S): 9 INJECTION INTRAMUSCULAR; INTRAVENOUS; SUBCUTANEOUS at 02:34

## 2017-04-07 RX ADMIN — ESCITALOPRAM OXALATE 20 MILLIGRAM(S): 10 TABLET, FILM COATED ORAL at 13:10

## 2017-04-07 RX ADMIN — CLOPIDOGREL BISULFATE 75 MILLIGRAM(S): 75 TABLET, FILM COATED ORAL at 13:10

## 2017-04-07 RX ADMIN — GABAPENTIN 300 MILLIGRAM(S): 400 CAPSULE ORAL at 18:19

## 2017-04-07 RX ADMIN — TAMSULOSIN HYDROCHLORIDE 0.4 MILLIGRAM(S): 0.4 CAPSULE ORAL at 21:26

## 2017-04-07 RX ADMIN — ATORVASTATIN CALCIUM 80 MILLIGRAM(S): 80 TABLET, FILM COATED ORAL at 21:26

## 2017-04-07 RX ADMIN — Medication 250 MILLIGRAM(S): at 04:32

## 2017-04-07 RX ADMIN — SODIUM CHLORIDE 250 MILLILITER(S): 9 INJECTION INTRAMUSCULAR; INTRAVENOUS; SUBCUTANEOUS at 18:19

## 2017-04-07 RX ADMIN — Medication 81 MILLIGRAM(S): at 13:10

## 2017-04-07 RX ADMIN — HEPARIN SODIUM 5000 UNIT(S): 5000 INJECTION INTRAVENOUS; SUBCUTANEOUS at 13:10

## 2017-04-07 RX ADMIN — PIPERACILLIN AND TAZOBACTAM 200 GRAM(S): 4; .5 INJECTION, POWDER, LYOPHILIZED, FOR SOLUTION INTRAVENOUS at 03:54

## 2017-04-07 RX ADMIN — RIVASTIGMINE 1 PATCH: 4.6 PATCH, EXTENDED RELEASE TRANSDERMAL at 06:50

## 2017-04-07 RX ADMIN — HEPARIN SODIUM 5000 UNIT(S): 5000 INJECTION INTRAVENOUS; SUBCUTANEOUS at 18:19

## 2017-04-07 RX ADMIN — PANTOPRAZOLE SODIUM 40 MILLIGRAM(S): 20 TABLET, DELAYED RELEASE ORAL at 13:10

## 2017-04-07 RX ADMIN — GABAPENTIN 300 MILLIGRAM(S): 400 CAPSULE ORAL at 06:44

## 2017-04-08 LAB
ALBUMIN SERPL ELPH-MCNC: 3.3 G/DL — SIGNIFICANT CHANGE UP (ref 3.3–5)
ALP SERPL-CCNC: 66 U/L — SIGNIFICANT CHANGE UP (ref 40–120)
ALT FLD-CCNC: 26 U/L RC — SIGNIFICANT CHANGE UP (ref 10–45)
ANION GAP SERPL CALC-SCNC: 16 MMOL/L — SIGNIFICANT CHANGE UP (ref 5–17)
AST SERPL-CCNC: 50 U/L — HIGH (ref 10–40)
BASOPHILS # BLD AUTO: 0 K/UL — SIGNIFICANT CHANGE UP (ref 0–0.2)
BASOPHILS NFR BLD AUTO: 0.1 % — SIGNIFICANT CHANGE UP (ref 0–2)
BILIRUB SERPL-MCNC: 1.2 MG/DL — SIGNIFICANT CHANGE UP (ref 0.2–1.2)
BUN SERPL-MCNC: 33 MG/DL — HIGH (ref 7–23)
CALCIUM SERPL-MCNC: 8.9 MG/DL — SIGNIFICANT CHANGE UP (ref 8.4–10.5)
CHLORIDE SERPL-SCNC: 100 MMOL/L — SIGNIFICANT CHANGE UP (ref 96–108)
CK MB BLD-MCNC: 2.7 % — SIGNIFICANT CHANGE UP (ref 0–3.5)
CK MB CFR SERPL CALC: 6.8 NG/ML — HIGH (ref 0–6.7)
CK SERPL-CCNC: 252 U/L — HIGH (ref 30–200)
CO2 SERPL-SCNC: 22 MMOL/L — SIGNIFICANT CHANGE UP (ref 22–31)
CREAT SERPL-MCNC: 1.16 MG/DL — SIGNIFICANT CHANGE UP (ref 0.5–1.3)
EOSINOPHIL # BLD AUTO: 0.1 K/UL — SIGNIFICANT CHANGE UP (ref 0–0.5)
EOSINOPHIL NFR BLD AUTO: 0.7 % — SIGNIFICANT CHANGE UP (ref 0–6)
GLUCOSE SERPL-MCNC: 135 MG/DL — HIGH (ref 70–99)
HCT VFR BLD CALC: 43.7 % — SIGNIFICANT CHANGE UP (ref 39–50)
HGB BLD-MCNC: 14.5 G/DL — SIGNIFICANT CHANGE UP (ref 13–17)
LYMPHOCYTES # BLD AUTO: 19.3 % — SIGNIFICANT CHANGE UP (ref 13–44)
LYMPHOCYTES # BLD AUTO: 2.1 K/UL — SIGNIFICANT CHANGE UP (ref 1–3.3)
MAGNESIUM SERPL-MCNC: 2.5 MG/DL — SIGNIFICANT CHANGE UP (ref 1.6–2.6)
MCHC RBC-ENTMCNC: 30 PG — SIGNIFICANT CHANGE UP (ref 27–34)
MCHC RBC-ENTMCNC: 33.3 GM/DL — SIGNIFICANT CHANGE UP (ref 32–36)
MCV RBC AUTO: 90.3 FL — SIGNIFICANT CHANGE UP (ref 80–100)
MONOCYTES # BLD AUTO: 1.2 K/UL — HIGH (ref 0–0.9)
MONOCYTES NFR BLD AUTO: 11.3 % — SIGNIFICANT CHANGE UP (ref 2–14)
NEUTROPHILS # BLD AUTO: 7.6 K/UL — HIGH (ref 1.8–7.4)
NEUTROPHILS NFR BLD AUTO: 68.7 % — SIGNIFICANT CHANGE UP (ref 43–77)
PHOSPHATE SERPL-MCNC: 3.1 MG/DL — SIGNIFICANT CHANGE UP (ref 2.5–4.5)
PLATELET # BLD AUTO: 145 K/UL — LOW (ref 150–400)
POTASSIUM SERPL-MCNC: 3.9 MMOL/L — SIGNIFICANT CHANGE UP (ref 3.5–5.3)
POTASSIUM SERPL-SCNC: 3.9 MMOL/L — SIGNIFICANT CHANGE UP (ref 3.5–5.3)
PROT SERPL-MCNC: 6.9 G/DL — SIGNIFICANT CHANGE UP (ref 6–8.3)
RBC # BLD: 4.84 M/UL — SIGNIFICANT CHANGE UP (ref 4.2–5.8)
RBC # FLD: 13.4 % — SIGNIFICANT CHANGE UP (ref 10.3–14.5)
SODIUM SERPL-SCNC: 138 MMOL/L — SIGNIFICANT CHANGE UP (ref 135–145)
TROPONIN T SERPL-MCNC: 3.25 NG/ML — HIGH (ref 0–0.06)
WBC # BLD: 11 K/UL — HIGH (ref 3.8–10.5)
WBC # FLD AUTO: 11 K/UL — HIGH (ref 3.8–10.5)

## 2017-04-08 PROCEDURE — 99233 SBSQ HOSP IP/OBS HIGH 50: CPT

## 2017-04-08 PROCEDURE — 93010 ELECTROCARDIOGRAM REPORT: CPT

## 2017-04-08 RX ORDER — CARVEDILOL PHOSPHATE 80 MG/1
3.12 CAPSULE, EXTENDED RELEASE ORAL EVERY 12 HOURS
Qty: 0 | Refills: 0 | Status: DISCONTINUED | OUTPATIENT
Start: 2017-04-08 | End: 2017-04-09

## 2017-04-08 RX ADMIN — RIVASTIGMINE 1 PATCH: 4.6 PATCH, EXTENDED RELEASE TRANSDERMAL at 06:43

## 2017-04-08 RX ADMIN — ATORVASTATIN CALCIUM 80 MILLIGRAM(S): 80 TABLET, FILM COATED ORAL at 21:54

## 2017-04-08 RX ADMIN — Medication 81 MILLIGRAM(S): at 12:22

## 2017-04-08 RX ADMIN — Medication 300 MILLIGRAM(S): at 12:22

## 2017-04-08 RX ADMIN — CLOPIDOGREL BISULFATE 75 MILLIGRAM(S): 75 TABLET, FILM COATED ORAL at 12:22

## 2017-04-08 RX ADMIN — TAMSULOSIN HYDROCHLORIDE 0.4 MILLIGRAM(S): 0.4 CAPSULE ORAL at 21:54

## 2017-04-08 RX ADMIN — RIVASTIGMINE 1 PATCH: 4.6 PATCH, EXTENDED RELEASE TRANSDERMAL at 06:39

## 2017-04-08 RX ADMIN — PANTOPRAZOLE SODIUM 40 MILLIGRAM(S): 20 TABLET, DELAYED RELEASE ORAL at 05:42

## 2017-04-08 RX ADMIN — CARVEDILOL PHOSPHATE 3.12 MILLIGRAM(S): 80 CAPSULE, EXTENDED RELEASE ORAL at 21:54

## 2017-04-08 RX ADMIN — ESCITALOPRAM OXALATE 20 MILLIGRAM(S): 10 TABLET, FILM COATED ORAL at 12:22

## 2017-04-08 RX ADMIN — CARVEDILOL PHOSPHATE 3.12 MILLIGRAM(S): 80 CAPSULE, EXTENDED RELEASE ORAL at 09:36

## 2017-04-08 RX ADMIN — HEPARIN SODIUM 5000 UNIT(S): 5000 INJECTION INTRAVENOUS; SUBCUTANEOUS at 05:41

## 2017-04-08 RX ADMIN — GABAPENTIN 300 MILLIGRAM(S): 400 CAPSULE ORAL at 18:54

## 2017-04-08 RX ADMIN — GABAPENTIN 300 MILLIGRAM(S): 400 CAPSULE ORAL at 05:41

## 2017-04-08 RX ADMIN — HEPARIN SODIUM 5000 UNIT(S): 5000 INJECTION INTRAVENOUS; SUBCUTANEOUS at 18:54

## 2017-04-09 ENCOUNTER — TRANSCRIPTION ENCOUNTER (OUTPATIENT)
Age: 67
End: 2017-04-09

## 2017-04-09 VITALS — RESPIRATION RATE: 18 BRPM | DIASTOLIC BLOOD PRESSURE: 72 MMHG | SYSTOLIC BLOOD PRESSURE: 114 MMHG | HEART RATE: 79 BPM

## 2017-04-09 LAB
ALBUMIN SERPL ELPH-MCNC: 3.4 G/DL — SIGNIFICANT CHANGE UP (ref 3.3–5)
ALP SERPL-CCNC: 67 U/L — SIGNIFICANT CHANGE UP (ref 40–120)
ALT FLD-CCNC: 27 U/L RC — SIGNIFICANT CHANGE UP (ref 10–45)
ANION GAP SERPL CALC-SCNC: 12 MMOL/L — SIGNIFICANT CHANGE UP (ref 5–17)
AST SERPL-CCNC: 34 U/L — SIGNIFICANT CHANGE UP (ref 10–40)
BASOPHILS # BLD AUTO: 0 K/UL — SIGNIFICANT CHANGE UP (ref 0–0.2)
BASOPHILS NFR BLD AUTO: 0.5 % — SIGNIFICANT CHANGE UP (ref 0–2)
BILIRUB SERPL-MCNC: 0.8 MG/DL — SIGNIFICANT CHANGE UP (ref 0.2–1.2)
BUN SERPL-MCNC: 29 MG/DL — HIGH (ref 7–23)
CALCIUM SERPL-MCNC: 8.9 MG/DL — SIGNIFICANT CHANGE UP (ref 8.4–10.5)
CHLORIDE SERPL-SCNC: 103 MMOL/L — SIGNIFICANT CHANGE UP (ref 96–108)
CK MB BLD-MCNC: 2.1 % — SIGNIFICANT CHANGE UP (ref 0–3.5)
CK MB CFR SERPL CALC: 3.2 NG/ML — SIGNIFICANT CHANGE UP (ref 0–6.7)
CK SERPL-CCNC: 151 U/L — SIGNIFICANT CHANGE UP (ref 30–200)
CO2 SERPL-SCNC: 25 MMOL/L — SIGNIFICANT CHANGE UP (ref 22–31)
CREAT SERPL-MCNC: 0.96 MG/DL — SIGNIFICANT CHANGE UP (ref 0.5–1.3)
EOSINOPHIL # BLD AUTO: 0.2 K/UL — SIGNIFICANT CHANGE UP (ref 0–0.5)
EOSINOPHIL NFR BLD AUTO: 2.5 % — SIGNIFICANT CHANGE UP (ref 0–6)
GLUCOSE SERPL-MCNC: 143 MG/DL — HIGH (ref 70–99)
HCT VFR BLD CALC: 46.2 % — SIGNIFICANT CHANGE UP (ref 39–50)
HGB BLD-MCNC: 15.1 G/DL — SIGNIFICANT CHANGE UP (ref 13–17)
LYMPHOCYTES # BLD AUTO: 1.6 K/UL — SIGNIFICANT CHANGE UP (ref 1–3.3)
LYMPHOCYTES # BLD AUTO: 22.1 % — SIGNIFICANT CHANGE UP (ref 13–44)
MAGNESIUM SERPL-MCNC: 2.4 MG/DL — SIGNIFICANT CHANGE UP (ref 1.6–2.6)
MCHC RBC-ENTMCNC: 29.7 PG — SIGNIFICANT CHANGE UP (ref 27–34)
MCHC RBC-ENTMCNC: 32.6 GM/DL — SIGNIFICANT CHANGE UP (ref 32–36)
MCV RBC AUTO: 91.1 FL — SIGNIFICANT CHANGE UP (ref 80–100)
MONOCYTES # BLD AUTO: 0.9 K/UL — SIGNIFICANT CHANGE UP (ref 0–0.9)
MONOCYTES NFR BLD AUTO: 12.1 % — SIGNIFICANT CHANGE UP (ref 2–14)
NEUTROPHILS # BLD AUTO: 4.7 K/UL — SIGNIFICANT CHANGE UP (ref 1.8–7.4)
NEUTROPHILS NFR BLD AUTO: 62.9 % — SIGNIFICANT CHANGE UP (ref 43–77)
PHOSPHATE SERPL-MCNC: 3.2 MG/DL — SIGNIFICANT CHANGE UP (ref 2.5–4.5)
PLATELET # BLD AUTO: 169 K/UL — SIGNIFICANT CHANGE UP (ref 150–400)
POTASSIUM SERPL-MCNC: 4.1 MMOL/L — SIGNIFICANT CHANGE UP (ref 3.5–5.3)
POTASSIUM SERPL-SCNC: 4.1 MMOL/L — SIGNIFICANT CHANGE UP (ref 3.5–5.3)
PROT SERPL-MCNC: 7.1 G/DL — SIGNIFICANT CHANGE UP (ref 6–8.3)
RBC # BLD: 5.07 M/UL — SIGNIFICANT CHANGE UP (ref 4.2–5.8)
RBC # FLD: 13.1 % — SIGNIFICANT CHANGE UP (ref 10.3–14.5)
SODIUM SERPL-SCNC: 140 MMOL/L — SIGNIFICANT CHANGE UP (ref 135–145)
TROPONIN T SERPL-MCNC: 3.35 NG/ML — HIGH (ref 0–0.06)
WBC # BLD: 7.5 K/UL — SIGNIFICANT CHANGE UP (ref 3.8–10.5)
WBC # FLD AUTO: 7.5 K/UL — SIGNIFICANT CHANGE UP (ref 3.8–10.5)

## 2017-04-09 PROCEDURE — 93306 TTE W/DOPPLER COMPLETE: CPT

## 2017-04-09 PROCEDURE — 84443 ASSAY THYROID STIM HORMONE: CPT

## 2017-04-09 PROCEDURE — 83735 ASSAY OF MAGNESIUM: CPT

## 2017-04-09 PROCEDURE — 86900 BLOOD TYPING SEROLOGIC ABO: CPT

## 2017-04-09 PROCEDURE — 84295 ASSAY OF SERUM SODIUM: CPT

## 2017-04-09 PROCEDURE — 93308 TTE F-UP OR LMTD: CPT

## 2017-04-09 PROCEDURE — 85610 PROTHROMBIN TIME: CPT

## 2017-04-09 PROCEDURE — C9606: CPT | Mod: LD

## 2017-04-09 PROCEDURE — 84132 ASSAY OF SERUM POTASSIUM: CPT

## 2017-04-09 PROCEDURE — 81001 URINALYSIS AUTO W/SCOPE: CPT

## 2017-04-09 PROCEDURE — 83605 ASSAY OF LACTIC ACID: CPT

## 2017-04-09 PROCEDURE — 93567 NJX CAR CTH SPRVLV AORTGRPHY: CPT

## 2017-04-09 PROCEDURE — 85730 THROMBOPLASTIN TIME PARTIAL: CPT

## 2017-04-09 PROCEDURE — C1887: CPT

## 2017-04-09 PROCEDURE — C8924: CPT

## 2017-04-09 PROCEDURE — 87486 CHLMYD PNEUM DNA AMP PROBE: CPT

## 2017-04-09 PROCEDURE — 82803 BLOOD GASES ANY COMBINATION: CPT

## 2017-04-09 PROCEDURE — 83880 ASSAY OF NATRIURETIC PEPTIDE: CPT

## 2017-04-09 PROCEDURE — 82553 CREATINE MB FRACTION: CPT

## 2017-04-09 PROCEDURE — 93458 L HRT ARTERY/VENTRICLE ANGIO: CPT | Mod: 59

## 2017-04-09 PROCEDURE — 99233 SBSQ HOSP IP/OBS HIGH 50: CPT

## 2017-04-09 PROCEDURE — 80053 COMPREHEN METABOLIC PANEL: CPT

## 2017-04-09 PROCEDURE — 87798 DETECT AGENT NOS DNA AMP: CPT

## 2017-04-09 PROCEDURE — 87040 BLOOD CULTURE FOR BACTERIA: CPT

## 2017-04-09 PROCEDURE — 82330 ASSAY OF CALCIUM: CPT

## 2017-04-09 PROCEDURE — 83036 HEMOGLOBIN GLYCOSYLATED A1C: CPT

## 2017-04-09 PROCEDURE — 84484 ASSAY OF TROPONIN QUANT: CPT

## 2017-04-09 PROCEDURE — 82550 ASSAY OF CK (CPK): CPT

## 2017-04-09 PROCEDURE — 86901 BLOOD TYPING SEROLOGIC RH(D): CPT

## 2017-04-09 PROCEDURE — 87633 RESP VIRUS 12-25 TARGETS: CPT

## 2017-04-09 PROCEDURE — C1725: CPT

## 2017-04-09 PROCEDURE — 93321 DOPPLER ECHO F-UP/LMTD STD: CPT

## 2017-04-09 PROCEDURE — 84100 ASSAY OF PHOSPHORUS: CPT

## 2017-04-09 PROCEDURE — 85014 HEMATOCRIT: CPT

## 2017-04-09 PROCEDURE — C1769: CPT

## 2017-04-09 PROCEDURE — C1874: CPT

## 2017-04-09 PROCEDURE — 93005 ELECTROCARDIOGRAM TRACING: CPT

## 2017-04-09 PROCEDURE — 82947 ASSAY GLUCOSE BLOOD QUANT: CPT

## 2017-04-09 PROCEDURE — 86850 RBC ANTIBODY SCREEN: CPT

## 2017-04-09 PROCEDURE — 93308 TTE F-UP OR LMTD: CPT | Mod: 26

## 2017-04-09 PROCEDURE — 93321 DOPPLER ECHO F-UP/LMTD STD: CPT | Mod: 26

## 2017-04-09 PROCEDURE — 71045 X-RAY EXAM CHEST 1 VIEW: CPT

## 2017-04-09 PROCEDURE — 93010 ELECTROCARDIOGRAM REPORT: CPT

## 2017-04-09 PROCEDURE — 85027 COMPLETE CBC AUTOMATED: CPT

## 2017-04-09 PROCEDURE — C1894: CPT

## 2017-04-09 PROCEDURE — 87581 M.PNEUMON DNA AMP PROBE: CPT

## 2017-04-09 PROCEDURE — 80061 LIPID PANEL: CPT

## 2017-04-09 PROCEDURE — 82435 ASSAY OF BLOOD CHLORIDE: CPT

## 2017-04-09 PROCEDURE — 96374 THER/PROPH/DIAG INJ IV PUSH: CPT

## 2017-04-09 PROCEDURE — 99285 EMERGENCY DEPT VISIT HI MDM: CPT | Mod: 25

## 2017-04-09 RX ORDER — TAMSULOSIN HYDROCHLORIDE 0.4 MG/1
1 CAPSULE ORAL
Qty: 30 | Refills: 0
Start: 2017-04-09 | End: 2017-05-09

## 2017-04-09 RX ORDER — CARVEDILOL PHOSPHATE 80 MG/1
1 CAPSULE, EXTENDED RELEASE ORAL
Qty: 60 | Refills: 0
Start: 2017-04-09 | End: 2017-05-09

## 2017-04-09 RX ORDER — CARVEDILOL PHOSPHATE 80 MG/1
6.25 CAPSULE, EXTENDED RELEASE ORAL EVERY 12 HOURS
Qty: 0 | Refills: 0 | Status: DISCONTINUED | OUTPATIENT
Start: 2017-04-09 | End: 2017-04-09

## 2017-04-09 RX ORDER — ATORVASTATIN CALCIUM 80 MG/1
1 TABLET, FILM COATED ORAL
Qty: 0 | Refills: 0 | COMMUNITY

## 2017-04-09 RX ORDER — CLOPIDOGREL BISULFATE 75 MG/1
1 TABLET, FILM COATED ORAL
Qty: 30 | Refills: 0
Start: 2017-04-09 | End: 2017-05-09

## 2017-04-09 RX ORDER — ASPIRIN/CALCIUM CARB/MAGNESIUM 324 MG
1 TABLET ORAL
Qty: 30 | Refills: 0
Start: 2017-04-09 | End: 2017-05-09

## 2017-04-09 RX ORDER — LISINOPRIL 2.5 MG/1
2.5 TABLET ORAL DAILY
Qty: 0 | Refills: 0 | Status: DISCONTINUED | OUTPATIENT
Start: 2017-04-09 | End: 2017-04-09

## 2017-04-09 RX ORDER — LISINOPRIL 2.5 MG/1
1 TABLET ORAL
Qty: 30 | Refills: 0
Start: 2017-04-09 | End: 2017-05-09

## 2017-04-09 RX ORDER — CARVEDILOL PHOSPHATE 80 MG/1
1 CAPSULE, EXTENDED RELEASE ORAL
Qty: 0 | Refills: 0 | COMMUNITY

## 2017-04-09 RX ORDER — ATORVASTATIN CALCIUM 80 MG/1
1 TABLET, FILM COATED ORAL
Qty: 30 | Refills: 0
Start: 2017-04-09 | End: 2017-05-09

## 2017-04-09 RX ADMIN — RIVASTIGMINE 1 PATCH: 4.6 PATCH, EXTENDED RELEASE TRANSDERMAL at 06:08

## 2017-04-09 RX ADMIN — CLOPIDOGREL BISULFATE 75 MILLIGRAM(S): 75 TABLET, FILM COATED ORAL at 11:27

## 2017-04-09 RX ADMIN — RIVASTIGMINE 1 PATCH: 4.6 PATCH, EXTENDED RELEASE TRANSDERMAL at 06:09

## 2017-04-09 RX ADMIN — Medication 81 MILLIGRAM(S): at 11:28

## 2017-04-09 RX ADMIN — GABAPENTIN 300 MILLIGRAM(S): 400 CAPSULE ORAL at 05:46

## 2017-04-09 RX ADMIN — CARVEDILOL PHOSPHATE 6.25 MILLIGRAM(S): 80 CAPSULE, EXTENDED RELEASE ORAL at 11:28

## 2017-04-09 RX ADMIN — PANTOPRAZOLE SODIUM 40 MILLIGRAM(S): 20 TABLET, DELAYED RELEASE ORAL at 05:46

## 2017-04-09 RX ADMIN — HEPARIN SODIUM 5000 UNIT(S): 5000 INJECTION INTRAVENOUS; SUBCUTANEOUS at 05:46

## 2017-04-09 RX ADMIN — LISINOPRIL 2.5 MILLIGRAM(S): 2.5 TABLET ORAL at 05:48

## 2017-04-09 RX ADMIN — Medication 300 MILLIGRAM(S): at 11:27

## 2017-04-09 RX ADMIN — ESCITALOPRAM OXALATE 20 MILLIGRAM(S): 10 TABLET, FILM COATED ORAL at 11:27

## 2017-04-09 NOTE — DISCHARGE NOTE ADULT - MEDICATION SUMMARY - MEDICATIONS TO CHANGE
I will SWITCH the dose or number of times a day I take the medications listed below when I get home from the hospital:    atorvastatin 10 mg oral tablet  -- 1 tab(s) by mouth once a day    Coreg CR 10 mg oral capsule, extended release  -- 1 cap(s) by mouth once a day (in the morning)

## 2017-04-09 NOTE — DISCHARGE NOTE ADULT - PLAN OF CARE
You will be chest pain free. You will maintian patent arteries by taking dual antiplatelet therapy No heavy lifting or pushing/pulling with procedure arm for 2 weeks. No driving for 2 days. You may shower 24 hours following the procedure but avoid baths/swimming for 1 week. Check your wrist site for bleeding and/or swelling daily following procedure and call your doctor immediately if it occurs or if you experience increased pain at the site. Follow up with your cardiologist in 1-2 weeks. You may call [facility] if you have any questions/concerns regarding your procedure [phone]. Your blood pressure will be controlled. Continue with your blood pressure medications; eat a heart healthy diet with low salt diet; exercise regularly (consult with your physician or cardiologist first); maintain a heart healthy weight; if you smoke - quit (A resource to help you stop smoking is the [facility] – phone number 548-760-5045.); include healthy ways to manage stress. Continue to follow with your primary care physician or cardiologist. You will be chest pain free. You will maintain patent arteries by taking dual antiplatelet therapy Do Not Stop taking Aspirin or Plavix without speaking with your cardiologist  No heavy lifting or pushing/pulling with procedure arm for 2 weeks. No driving for 2 days. You may shower 24 hours following the procedure but avoid baths/swimming for 1 week. Check your wrist site for bleeding and/or swelling daily following procedure and call your doctor immediately if it occurs or if you experience increased pain at the site. Follow up with your cardiologist in 1-2 weeks. You may call [facility] if you have any questions/concerns regarding your procedure [phone]. Continue with your blood pressure medications; eat a heart healthy diet with low salt diet; exercise regularly (consult with your physician or cardiologist first); maintain a heart healthy weight; include healthy ways to manage stress. Continue to follow with your primary care physician or cardiologist. Your glucose will remain within normal limits Your hemoglobin A1C was 7.6 on this admission.  This blood test shows your average blood sugar level for the past 2 to 3 months. It is used to diagnose diabetes; and for diabetic patient, it helps show whether it's under control. Lifestyle changes include Weight control; Exercise - like walking; and Nutrition. Go for meals that mix low-fat protein, vegetables, and whole grains. Limit calories, serving sizes, sugar, and starchy carbs. Favor fiber-rich foods, which help you feel full and not eat too much  Follow up with your primary care physician

## 2017-04-09 NOTE — DISCHARGE NOTE ADULT - HOSPITAL COURSE
67M with a PMH of HTN, DM2, HLD, CVA (at 51) that presents with substernal chest pain found to have LUTHER on EKG. Transferred for cardiac cath. Found to have 100% LAD lesion now s/p 3 KEITH to Mid to Distal LAD. Patient had TTE performe dto r/o thrombus followng his PCI. 67M with a PMH of HTN, DM2, HLD, CVA (at 51) that presents with substernal chest pain found to have LUTHER on EKG. Transferred for cardiac cath. Found to have 100% LAD lesion now s/p 3 KEITH to Mid to Distal LAD. Patient had TTE performed to r/o thrombus following his PCI. 67M with a PMH of HTN, DM2, HLD, CVA (at 51) that presents with substernal chest pain found to have LUTHER on EKG. Transferred for cardiac cath. Found to have 100% LAD lesion now s/p 3 KEITH to Mid to Distal LAD. He was monitored in CCU2 where he remained stable.  The right radial site was ok, no hematoma, positive pulse. Patient had TTE performed to r/o thrombus following his PCI, it showed .  He was discharged home in stable condition. 67M with a PMH of HTN, DM2, HLD, CVA (at 51) that presents with substernal chest pain found to have LUTHER on EKG. Transferred for cardiac cath. Found to have 100% LAD lesion now s/p 3 KEITH to Mid to Distal LAD. He was monitored in CCU2 where he remained stable.  The right radial site was ok, no hematoma, positive pulse. Patient had TTE performed on 4/9 to r/o thrombus following his PCI, it showed no obvious LV thrombus.  He was discharged home in stable condition.

## 2017-04-09 NOTE — DISCHARGE NOTE ADULT - CARE PLAN
Principal Discharge DX:	Coronary artery disease involving native coronary artery of native heart with unstable angina pectoris  Goal:	You will be chest pain free. You will maintian patent arteries by taking dual antiplatelet therapy  Instructions for follow-up, activity and diet:	No heavy lifting or pushing/pulling with procedure arm for 2 weeks. No driving for 2 days. You may shower 24 hours following the procedure but avoid baths/swimming for 1 week. Check your wrist site for bleeding and/or swelling daily following procedure and call your doctor immediately if it occurs or if you experience increased pain at the site. Follow up with your cardiologist in 1-2 weeks. You may call [facility] if you have any questions/concerns regarding your procedure [phone].  Secondary Diagnosis:	Essential hypertension  Goal:	Your blood pressure will be controlled.  Instructions for follow-up, activity and diet:	Continue with your blood pressure medications; eat a heart healthy diet with low salt diet; exercise regularly (consult with your physician or cardiologist first); maintain a heart healthy weight; if you smoke - quit (A resource to help you stop smoking is the [facility] – phone number 001-945-2188.); include healthy ways to manage stress. Continue to follow with your primary care physician or cardiologist. Principal Discharge DX:	Coronary artery disease involving native coronary artery of native heart with unstable angina pectoris  Goal:	You will be chest pain free. You will maintain patent arteries by taking dual antiplatelet therapy  Instructions for follow-up, activity and diet:	Do Not Stop taking Aspirin or Plavix without speaking with your cardiologist  No heavy lifting or pushing/pulling with procedure arm for 2 weeks. No driving for 2 days. You may shower 24 hours following the procedure but avoid baths/swimming for 1 week. Check your wrist site for bleeding and/or swelling daily following procedure and call your doctor immediately if it occurs or if you experience increased pain at the site. Follow up with your cardiologist in 1-2 weeks. You may call [facility] if you have any questions/concerns regarding your procedure [phone].  Secondary Diagnosis:	Essential hypertension  Goal:	Your blood pressure will be controlled.  Instructions for follow-up, activity and diet:	Continue with your blood pressure medications; eat a heart healthy diet with low salt diet; exercise regularly (consult with your physician or cardiologist first); maintain a heart healthy weight; include healthy ways to manage stress. Continue to follow with your primary care physician or cardiologist. Principal Discharge DX:	ST elevation myocardial infarction involving left anterior descending (LAD) coronary artery  Goal:	You will be chest pain free. You will maintain patent arteries by taking dual antiplatelet therapy  Instructions for follow-up, activity and diet:	Do Not Stop taking Aspirin or Plavix without speaking with your cardiologist  No heavy lifting or pushing/pulling with procedure arm for 2 weeks. No driving for 2 days. You may shower 24 hours following the procedure but avoid baths/swimming for 1 week. Check your wrist site for bleeding and/or swelling daily following procedure and call your doctor immediately if it occurs or if you experience increased pain at the site. Follow up with your cardiologist in 1-2 weeks. You may call [facility] if you have any questions/concerns regarding your procedure [phone].  Secondary Diagnosis:	Essential hypertension  Goal:	Your blood pressure will be controlled.  Instructions for follow-up, activity and diet:	Continue with your blood pressure medications; eat a heart healthy diet with low salt diet; exercise regularly (consult with your physician or cardiologist first); maintain a heart healthy weight; include healthy ways to manage stress. Continue to follow with your primary care physician or cardiologist.  Secondary Diagnosis:	Type 2 diabetes mellitus without complication, without long-term current use of insulin  Goal:	Your glucose will remain within normal limits  Instructions for follow-up, activity and diet:	Your hemoglobin A1C was 7.6 on this admission.  This blood test shows your average blood sugar level for the past 2 to 3 months. It is used to diagnose diabetes; and for diabetic patient, it helps show whether it's under control. Lifestyle changes include Weight control; Exercise - like walking; and Nutrition. Go for meals that mix low-fat protein, vegetables, and whole grains. Limit calories, serving sizes, sugar, and starchy carbs. Favor fiber-rich foods, which help you feel full and not eat too much  Follow up with your primary care physician Principal Discharge DX:	ST elevation myocardial infarction involving left anterior descending (LAD) coronary artery  Goal:	You will be chest pain free. You will maintain patent arteries by taking dual antiplatelet therapy  Instructions for follow-up, activity and diet:	Do Not Stop taking Aspirin or Plavix without speaking with your cardiologist  No heavy lifting or pushing/pulling with procedure arm for 2 weeks. No driving for 2 days. You may shower 24 hours following the procedure but avoid baths/swimming for 1 week. Check your wrist site for bleeding and/or swelling daily following procedure and call your doctor immediately if it occurs or if you experience increased pain at the site. Follow up with your cardiologist in 1-2 weeks. You may call [facility] if you have any questions/concerns regarding your procedure [phone].  Secondary Diagnosis:	Essential hypertension  Goal:	Your blood pressure will be controlled.  Instructions for follow-up, activity and diet:	Continue with your blood pressure medications; eat a heart healthy diet with low salt diet; exercise regularly (consult with your physician or cardiologist first); maintain a heart healthy weight; include healthy ways to manage stress. Continue to follow with your primary care physician or cardiologist.  Secondary Diagnosis:	Type 2 diabetes mellitus without complication, without long-term current use of insulin  Goal:	Your glucose will remain within normal limits  Instructions for follow-up, activity and diet:	Your hemoglobin A1C was 7.6 on this admission.  This blood test shows your average blood sugar level for the past 2 to 3 months. It is used to diagnose diabetes; and for diabetic patient, it helps show whether it's under control. Lifestyle changes include Weight control; Exercise - like walking; and Nutrition. Go for meals that mix low-fat protein, vegetables, and whole grains. Limit calories, serving sizes, sugar, and starchy carbs. Favor fiber-rich foods, which help you feel full and not eat too much  Follow up with your primary care physician Principal Discharge DX:	ST elevation myocardial infarction involving left anterior descending (LAD) coronary artery  Goal:	You will be chest pain free. You will maintain patent arteries by taking dual antiplatelet therapy  Instructions for follow-up, activity and diet:	Do Not Stop taking Aspirin or Plavix without speaking with your cardiologist  No heavy lifting or pushing/pulling with procedure arm for 2 weeks. No driving for 2 days. You may shower 24 hours following the procedure but avoid baths/swimming for 1 week. Check your wrist site for bleeding and/or swelling daily following procedure and call your doctor immediately if it occurs or if you experience increased pain at the site. Follow up with your cardiologist in 1-2 weeks. You may call [facility] if you have any questions/concerns regarding your procedure [phone].  Secondary Diagnosis:	Essential hypertension  Goal:	Your blood pressure will be controlled.  Instructions for follow-up, activity and diet:	Continue with your blood pressure medications; eat a heart healthy diet with low salt diet; exercise regularly (consult with your physician or cardiologist first); maintain a heart healthy weight; include healthy ways to manage stress. Continue to follow with your primary care physician or cardiologist.  Secondary Diagnosis:	Type 2 diabetes mellitus without complication, without long-term current use of insulin  Goal:	Your glucose will remain within normal limits  Instructions for follow-up, activity and diet:	Your hemoglobin A1C was 7.6 on this admission.  This blood test shows your average blood sugar level for the past 2 to 3 months. It is used to diagnose diabetes; and for diabetic patient, it helps show whether it's under control. Lifestyle changes include Weight control; Exercise - like walking; and Nutrition. Go for meals that mix low-fat protein, vegetables, and whole grains. Limit calories, serving sizes, sugar, and starchy carbs. Favor fiber-rich foods, which help you feel full and not eat too much  Follow up with your primary care physician Principal Discharge DX:	ST elevation myocardial infarction involving left anterior descending (LAD) coronary artery  Goal:	You will be chest pain free. You will maintain patent arteries by taking dual antiplatelet therapy  Instructions for follow-up, activity and diet:	Do Not Stop taking Aspirin or Plavix without speaking with your cardiologist  No heavy lifting or pushing/pulling with procedure arm for 2 weeks. No driving for 2 days. You may shower 24 hours following the procedure but avoid baths/swimming for 1 week. Check your wrist site for bleeding and/or swelling daily following procedure and call your doctor immediately if it occurs or if you experience increased pain at the site. Follow up with your cardiologist in 1-2 weeks. You may call [facility] if you have any questions/concerns regarding your procedure [phone].  Secondary Diagnosis:	Essential hypertension  Goal:	Your blood pressure will be controlled.  Instructions for follow-up, activity and diet:	Continue with your blood pressure medications; eat a heart healthy diet with low salt diet; exercise regularly (consult with your physician or cardiologist first); maintain a heart healthy weight; include healthy ways to manage stress. Continue to follow with your primary care physician or cardiologist.  Secondary Diagnosis:	Type 2 diabetes mellitus without complication, without long-term current use of insulin  Goal:	Your glucose will remain within normal limits  Instructions for follow-up, activity and diet:	Your hemoglobin A1C was 7.6 on this admission.  This blood test shows your average blood sugar level for the past 2 to 3 months. It is used to diagnose diabetes; and for diabetic patient, it helps show whether it's under control. Lifestyle changes include Weight control; Exercise - like walking; and Nutrition. Go for meals that mix low-fat protein, vegetables, and whole grains. Limit calories, serving sizes, sugar, and starchy carbs. Favor fiber-rich foods, which help you feel full and not eat too much  Follow up with your primary care physician Principal Discharge DX:	ST elevation myocardial infarction involving left anterior descending (LAD) coronary artery  Goal:	You will be chest pain free. You will maintain patent arteries by taking dual antiplatelet therapy  Instructions for follow-up, activity and diet:	Do Not Stop taking Aspirin or Plavix without speaking with your cardiologist  No heavy lifting or pushing/pulling with procedure arm for 2 weeks. No driving for 2 days. You may shower 24 hours following the procedure but avoid baths/swimming for 1 week. Check your wrist site for bleeding and/or swelling daily following procedure and call your doctor immediately if it occurs or if you experience increased pain at the site. Follow up with your cardiologist in 1-2 weeks. You may call [facility] if you have any questions/concerns regarding your procedure [phone].  Secondary Diagnosis:	Essential hypertension  Goal:	Your blood pressure will be controlled.  Instructions for follow-up, activity and diet:	Continue with your blood pressure medications; eat a heart healthy diet with low salt diet; exercise regularly (consult with your physician or cardiologist first); maintain a heart healthy weight; include healthy ways to manage stress. Continue to follow with your primary care physician or cardiologist.  Secondary Diagnosis:	Type 2 diabetes mellitus without complication, without long-term current use of insulin  Goal:	Your glucose will remain within normal limits  Instructions for follow-up, activity and diet:	Your hemoglobin A1C was 7.6 on this admission.  This blood test shows your average blood sugar level for the past 2 to 3 months. It is used to diagnose diabetes; and for diabetic patient, it helps show whether it's under control. Lifestyle changes include Weight control; Exercise - like walking; and Nutrition. Go for meals that mix low-fat protein, vegetables, and whole grains. Limit calories, serving sizes, sugar, and starchy carbs. Favor fiber-rich foods, which help you feel full and not eat too much  Follow up with your primary care physician Principal Discharge DX:	ST elevation myocardial infarction involving left anterior descending (LAD) coronary artery  Goal:	You will be chest pain free. You will maintain patent arteries by taking dual antiplatelet therapy  Instructions for follow-up, activity and diet:	Do Not Stop taking Aspirin or Plavix without speaking with your cardiologist  No heavy lifting or pushing/pulling with procedure arm for 2 weeks. No driving for 2 days. You may shower 24 hours following the procedure but avoid baths/swimming for 1 week. Check your wrist site for bleeding and/or swelling daily following procedure and call your doctor immediately if it occurs or if you experience increased pain at the site. Follow up with your cardiologist in 1-2 weeks. You may call [facility] if you have any questions/concerns regarding your procedure [phone].  Secondary Diagnosis:	Essential hypertension  Goal:	Your blood pressure will be controlled.  Instructions for follow-up, activity and diet:	Continue with your blood pressure medications; eat a heart healthy diet with low salt diet; exercise regularly (consult with your physician or cardiologist first); maintain a heart healthy weight; include healthy ways to manage stress. Continue to follow with your primary care physician or cardiologist.  Secondary Diagnosis:	Type 2 diabetes mellitus without complication, without long-term current use of insulin  Goal:	Your glucose will remain within normal limits  Instructions for follow-up, activity and diet:	Your hemoglobin A1C was 7.6 on this admission.  This blood test shows your average blood sugar level for the past 2 to 3 months. It is used to diagnose diabetes; and for diabetic patient, it helps show whether it's under control. Lifestyle changes include Weight control; Exercise - like walking; and Nutrition. Go for meals that mix low-fat protein, vegetables, and whole grains. Limit calories, serving sizes, sugar, and starchy carbs. Favor fiber-rich foods, which help you feel full and not eat too much  Follow up with your primary care physician

## 2017-04-09 NOTE — DISCHARGE NOTE ADULT - NS AS ACTIVITY OBS
No Heavy lifting/straining/Bathing allowed/Stairs allowed Stairs allowed/Bathing allowed/Walking-Outdoors allowed/No Heavy lifting/straining/Showering allowed/Walking-Indoors allowed

## 2017-04-09 NOTE — DISCHARGE NOTE ADULT - CARE PROVIDERS DIRECT ADDRESSES
[email or web address]

## 2017-04-09 NOTE — DISCHARGE NOTE ADULT - INSTRUCTIONS
Consistent Carbohydrate diet, low fat, low salt, low sugar Heart healthy diabetic diet, low fat, no added salt

## 2017-04-09 NOTE — DISCHARGE NOTE ADULT - MEDICATION SUMMARY - MEDICATIONS TO TAKE
I will START or STAY ON the medications listed below when I get home from the hospital:    aspirin 81 mg oral delayed release tablet  -- 1 tab(s) by mouth once a day  -- Indication: For ST elevation myocardial infarction involving left anterior descending (LAD) coronary artery    lisinopril 2.5 mg oral tablet  -- 1 tab(s) by mouth once a day  -- Indication: For ST elevation myocardial infarction involving left anterior descending (LAD) coronary artery    tamsulosin 0.4 mg oral capsule  -- 1 cap(s) by mouth once a day (at bedtime)  -- Indication: For urine retention    gabapentin 300 mg oral tablet  --  by mouth 2 times a day  -- Indication: For Gout    escitalopram 20 mg oral tablet  -- 1 tab(s) by mouth once a day  -- Indication: For Depression    Invokamet  mg-1000 mg oral tablet, extended release  -- 1 tab(s) by mouth once a day  -- Indication: For Type 2 diabetes mellitus without complication, without long-term current use of insulin    allopurinol 300 mg oral tablet  -- 1 tab(s) by mouth once a day  -- Indication: For Gout    atorvastatin 80 mg oral tablet  -- 1 tab(s) by mouth once a day (at bedtime)  -- Indication: For ST elevation myocardial infarction involving left anterior descending (LAD) coronary artery    clopidogrel 75 mg oral tablet  -- 1 tab(s) by mouth once a day  -- Indication: For ST elevation myocardial infarction involving left anterior descending (LAD) coronary artery    carvedilol 6.25 mg oral tablet  -- 1 tab(s) by mouth every 12 hours  -- Indication: For ST elevation myocardial infarction involving left anterior descending (LAD) coronary artery    Exelon 9.5 mg/24 hr transdermal film, extended release  -- 1 patch by mouth once a day  -- Indication: For memory    PriLOSEC OTC 20 mg oral delayed release tablet  -- 1 tab(s) by mouth once a day  -- Indication: For STomach protectant

## 2017-04-09 NOTE — DISCHARGE NOTE ADULT - PATIENT PORTAL LINK FT
“You can access the Follow[facility] Patient Portal, offered by [facility], by registering with the following website: [email or web address]”

## 2017-04-09 NOTE — DISCHARGE NOTE ADULT - CARE PROVIDER_API CALL
Álvaro Trevino), Cardiovascular Disease; Interventional Cardiology  [address] [place] [address]  Phone: [phone]  Fax: [phone] May Navarro), Cardiovascular Disease; Internal Medicine; Nuclear Cardiology  [address] [place] [address]  Phone: [phone]  Fax: [phone]    Krystian Zeng (), Internal Medicine  [address] [place] [address]  Phone: [phone]  Fax: [phone]

## 2017-04-09 NOTE — DISCHARGE NOTE ADULT - PRINCIPAL DIAGNOSIS
Coronary artery disease involving native coronary artery of native heart with unstable angina pectoris ST elevation myocardial infarction involving left anterior descending (LAD) coronary artery

## 2017-04-09 NOTE — DISCHARGE NOTE ADULT - ADDITIONAL INSTRUCTIONS
No heavy lifting, strenuous activity, bending, straining, or unnecessary stair climbing for 2 weeks. No driving for 2 days. You may shower 24 hours following the procedure but avoid baths/swimming for 1 week. Check your radial site for bleeding and/or swelling daily following procedure and call your doctor immediately if it occurs or if you experience increased pain at the site. Follow up with your cardiologist in 1-2 weeks. You may call [facility] if you have any questions/concerns regarding your procedure [phone]. Call to schedule appointment with cardiologist for one week from discharge Call to schedule appointment with cardiologist for one week from discharge.  Call to schedule appointment with your primary care physician for 2-3 weeks from discharge.

## 2017-04-11 LAB
CULTURE RESULTS: SIGNIFICANT CHANGE UP
SPECIMEN SOURCE: SIGNIFICANT CHANGE UP

## 2017-04-12 LAB
CULTURE RESULTS: SIGNIFICANT CHANGE UP
SPECIMEN SOURCE: SIGNIFICANT CHANGE UP

## 2017-05-04 ENCOUNTER — APPOINTMENT (OUTPATIENT)
Dept: CARDIOLOGY | Facility: CLINIC | Age: 67
End: 2017-05-04

## 2017-05-04 VITALS
HEART RATE: 67 BPM | HEIGHT: 69 IN | BODY MASS INDEX: 43.84 KG/M2 | OXYGEN SATURATION: 95 % | SYSTOLIC BLOOD PRESSURE: 162 MMHG | DIASTOLIC BLOOD PRESSURE: 106 MMHG | WEIGHT: 296 LBS

## 2017-05-04 DIAGNOSIS — I51.9 HEART DISEASE, UNSPECIFIED: ICD-10-CM

## 2017-05-04 DIAGNOSIS — Z86.39 PERSONAL HISTORY OF OTHER ENDOCRINE, NUTRITIONAL AND METABOLIC DISEASE: ICD-10-CM

## 2017-05-04 DIAGNOSIS — Z86.79 PERSONAL HISTORY OF OTHER DISEASES OF THE CIRCULATORY SYSTEM: ICD-10-CM

## 2017-05-04 DIAGNOSIS — Z82.49 FAMILY HISTORY OF ISCHEMIC HEART DISEASE AND OTHER DISEASES OF THE CIRCULATORY SYSTEM: ICD-10-CM

## 2017-05-06 ENCOUNTER — NON-APPOINTMENT (OUTPATIENT)
Age: 67
End: 2017-05-06

## 2017-05-06 PROBLEM — Z86.39 HISTORY OF TYPE 2 DIABETES MELLITUS: Status: RESOLVED | Noted: 2017-05-06 | Resolved: 2017-05-06

## 2017-05-06 PROBLEM — Z82.49 FAMILY HISTORY OF HYPERTENSION: Status: ACTIVE | Noted: 2017-05-06

## 2017-05-06 PROBLEM — I51.9 LV DYSFUNCTION: Status: ACTIVE | Noted: 2017-05-06

## 2017-05-06 PROBLEM — Z86.39 HISTORY OF HYPERLIPIDEMIA: Status: RESOLVED | Noted: 2017-05-06 | Resolved: 2017-05-06

## 2017-05-06 PROBLEM — Z86.79 HISTORY OF ESSENTIAL HYPERTENSION: Status: RESOLVED | Noted: 2017-05-06 | Resolved: 2017-05-06

## 2017-05-06 RX ORDER — CLOPIDOGREL BISULFATE 75 MG/1
75 TABLET, FILM COATED ORAL
Qty: 30 | Refills: 0 | Status: DISCONTINUED | COMMUNITY
Start: 2017-04-09

## 2017-05-06 RX ORDER — GABAPENTIN 300 MG/1
300 CAPSULE ORAL TWICE DAILY
Qty: 60 | Refills: 0 | Status: ACTIVE | COMMUNITY

## 2017-05-06 RX ORDER — TAMSULOSIN HYDROCHLORIDE 0.4 MG/1
0.4 CAPSULE ORAL
Qty: 30 | Refills: 0 | Status: DISCONTINUED | COMMUNITY
Start: 2017-04-09

## 2017-05-25 ENCOUNTER — OUTPATIENT (OUTPATIENT)
Dept: OUTPATIENT SERVICES | Facility: HOSPITAL | Age: 67
LOS: 1 days | End: 2017-05-25
Payer: MEDICARE

## 2017-05-25 ENCOUNTER — APPOINTMENT (OUTPATIENT)
Dept: RADIOLOGY | Facility: CLINIC | Age: 67
End: 2017-05-25

## 2017-05-25 DIAGNOSIS — Z00.8 ENCOUNTER FOR OTHER GENERAL EXAMINATION: ICD-10-CM

## 2017-05-25 PROCEDURE — 71046 X-RAY EXAM CHEST 2 VIEWS: CPT

## 2017-05-31 ENCOUNTER — APPOINTMENT (OUTPATIENT)
Dept: CARDIOLOGY | Facility: CLINIC | Age: 67
End: 2017-05-31

## 2017-05-31 VITALS
DIASTOLIC BLOOD PRESSURE: 86 MMHG | SYSTOLIC BLOOD PRESSURE: 134 MMHG | RESPIRATION RATE: 22 BRPM | OXYGEN SATURATION: 94 % | HEART RATE: 65 BPM

## 2017-05-31 VITALS
HEART RATE: 66 BPM | DIASTOLIC BLOOD PRESSURE: 76 MMHG | SYSTOLIC BLOOD PRESSURE: 134 MMHG | WEIGHT: 296 LBS | BODY MASS INDEX: 43.71 KG/M2

## 2017-06-28 ENCOUNTER — APPOINTMENT (OUTPATIENT)
Dept: CARDIOLOGY | Facility: CLINIC | Age: 67
End: 2017-06-28

## 2017-07-25 ENCOUNTER — APPOINTMENT (OUTPATIENT)
Dept: CARDIOLOGY | Facility: CLINIC | Age: 67
End: 2017-07-25

## 2017-07-25 VITALS
DIASTOLIC BLOOD PRESSURE: 90 MMHG | SYSTOLIC BLOOD PRESSURE: 150 MMHG | TEMPERATURE: 97.4 F | RESPIRATION RATE: 18 BRPM | HEART RATE: 64 BPM | OXYGEN SATURATION: 96 %

## 2017-07-25 RX ORDER — RIVASTIGMINE 9.5 MG/24H
9.5 PATCH, EXTENDED RELEASE TRANSDERMAL
Qty: 30 | Refills: 3 | Status: ACTIVE | COMMUNITY
Start: 1900-01-01 | End: 1900-01-01

## 2017-07-27 ENCOUNTER — NON-APPOINTMENT (OUTPATIENT)
Age: 67
End: 2017-07-27

## 2017-09-26 ENCOUNTER — APPOINTMENT (OUTPATIENT)
Dept: CARDIOLOGY | Facility: CLINIC | Age: 67
End: 2017-09-26
Payer: MEDICARE

## 2017-09-26 ENCOUNTER — NON-APPOINTMENT (OUTPATIENT)
Age: 67
End: 2017-09-26

## 2017-09-26 VITALS
BODY MASS INDEX: 40.88 KG/M2 | OXYGEN SATURATION: 95 % | SYSTOLIC BLOOD PRESSURE: 130 MMHG | TEMPERATURE: 97.4 F | HEIGHT: 69 IN | WEIGHT: 276 LBS | HEART RATE: 72 BPM | RESPIRATION RATE: 16 BRPM | DIASTOLIC BLOOD PRESSURE: 78 MMHG

## 2017-09-26 PROCEDURE — 93000 ELECTROCARDIOGRAM COMPLETE: CPT

## 2017-09-26 PROCEDURE — 99214 OFFICE O/P EST MOD 30 MIN: CPT | Mod: 25

## 2017-09-26 RX ORDER — LISINOPRIL 2.5 MG/1
2.5 TABLET ORAL
Qty: 30 | Refills: 0 | Status: DISCONTINUED | COMMUNITY
Start: 2017-04-09 | End: 2017-09-26

## 2017-09-26 RX ORDER — CLOPIDOGREL 75 MG/1
75 TABLET, FILM COATED ORAL DAILY
Qty: 1 | Refills: 3 | Status: ACTIVE | COMMUNITY
Start: 2017-09-26

## 2017-10-04 ENCOUNTER — APPOINTMENT (OUTPATIENT)
Dept: CARDIOLOGY | Facility: CLINIC | Age: 67
End: 2017-10-04

## 2017-10-05 ENCOUNTER — APPOINTMENT (OUTPATIENT)
Dept: CARDIOLOGY | Facility: CLINIC | Age: 67
End: 2017-10-05

## 2017-10-11 ENCOUNTER — APPOINTMENT (OUTPATIENT)
Dept: CARDIOLOGY | Facility: CLINIC | Age: 67
End: 2017-10-11

## 2019-05-23 NOTE — ED ADULT NURSE NOTE - THOUGHTS OF HOMICIDE/VIOLENCE TOWARDS OTHERS YN, MLM
Quality 130: Documentation Of Current Medications In The Medical Record: Current Medications Documented
Quality 111:Pneumonia Vaccination Status For Older Adults: Pneumococcal Vaccination Previously Received
Quality 110: Preventive Care And Screening: Influenza Immunization: Influenza Immunization previously received during influenza season
Detail Level: Detailed
Quality 402: Tobacco Use And Help With Quitting Among Adolescents: Patient screened for tobacco and is an ex-smoker
Quality 128: Preventive Care And Screening: Body Mass Index (Bmi) Screening And Follow-Up Plan: BMI is documented above normal parameters and a follow-up plan is documented
Quality 431: Preventive Care And Screening: Unhealthy Alcohol Use - Screening: Patient screened for unhealthy alcohol use using a single question and scores less than 2 times per year
No

## 2021-09-25 NOTE — PATIENT PROFILE ADULT. - NS PRO PT REFERRAL QUES 2 YN
Kenneth Echevarria), Orthopaedic Surgery Surgery  [address] [place] [address]  Phone: [phone]  Fax: [phone] scrotum/generalized no

## 2023-02-02 ENCOUNTER — NON-APPOINTMENT (OUTPATIENT)
Age: 73
End: 2023-02-02

## 2023-02-03 ENCOUNTER — LABORATORY RESULT (OUTPATIENT)
Age: 73
End: 2023-02-03

## 2023-02-03 ENCOUNTER — NON-APPOINTMENT (OUTPATIENT)
Age: 73
End: 2023-02-03

## 2023-02-03 ENCOUNTER — APPOINTMENT (OUTPATIENT)
Dept: CARDIOLOGY | Facility: CLINIC | Age: 73
End: 2023-02-03
Payer: MEDICARE

## 2023-02-03 VITALS
OXYGEN SATURATION: 97 % | HEIGHT: 69 IN | SYSTOLIC BLOOD PRESSURE: 180 MMHG | BODY MASS INDEX: 40.88 KG/M2 | WEIGHT: 276 LBS | DIASTOLIC BLOOD PRESSURE: 100 MMHG | HEART RATE: 68 BPM

## 2023-02-03 VITALS — SYSTOLIC BLOOD PRESSURE: 156 MMHG | DIASTOLIC BLOOD PRESSURE: 96 MMHG

## 2023-02-03 DIAGNOSIS — I25.2 OLD MYOCARDIAL INFARCTION: ICD-10-CM

## 2023-02-03 DIAGNOSIS — R06.02 SHORTNESS OF BREATH: ICD-10-CM

## 2023-02-03 DIAGNOSIS — Z87.898 PERSONAL HISTORY OF OTHER SPECIFIED CONDITIONS: ICD-10-CM

## 2023-02-03 DIAGNOSIS — I11.9 HYPERTENSIVE HEART DISEASE W/OUT HEART FAILURE: ICD-10-CM

## 2023-02-03 DIAGNOSIS — R94.31 ABNORMAL ELECTROCARDIOGRAM [ECG] [EKG]: ICD-10-CM

## 2023-02-03 LAB
ALBUMIN SERPL ELPH-MCNC: 4.3 G/DL
ALP BLD-CCNC: 111 U/L
ALT SERPL-CCNC: 17 U/L
ANION GAP SERPL CALC-SCNC: 10 MMOL/L
AST SERPL-CCNC: 16 U/L
BASOPHILS # BLD AUTO: 0.06 K/UL
BASOPHILS NFR BLD AUTO: 0.7 %
BILIRUB SERPL-MCNC: 1 MG/DL
BUN SERPL-MCNC: 16 MG/DL
CALCIUM SERPL-MCNC: 9.2 MG/DL
CHLORIDE SERPL-SCNC: 101 MMOL/L
CHOLEST SERPL-MCNC: 104 MG/DL
CO2 SERPL-SCNC: 30 MMOL/L
CREAT SERPL-MCNC: 0.93 MG/DL
EGFR: 87 ML/MIN/1.73M2
EOSINOPHIL # BLD AUTO: 0.22 K/UL
EOSINOPHIL NFR BLD AUTO: 2.7 %
GLUCOSE SERPL-MCNC: 102 MG/DL
HCT VFR BLD CALC: 48.2 %
HDLC SERPL-MCNC: 35 MG/DL
HGB BLD-MCNC: 15 G/DL
IMM GRANULOCYTES NFR BLD AUTO: 0.4 %
LDLC SERPL CALC-MCNC: 54 MG/DL
LYMPHOCYTES # BLD AUTO: 2.46 K/UL
LYMPHOCYTES NFR BLD AUTO: 30.2 %
MAN DIFF?: NORMAL
MCHC RBC-ENTMCNC: 27.1 PG
MCHC RBC-ENTMCNC: 31.1 GM/DL
MCV RBC AUTO: 87 FL
MONOCYTES # BLD AUTO: 0.74 K/UL
MONOCYTES NFR BLD AUTO: 9.1 %
NEUTROPHILS # BLD AUTO: 4.63 K/UL
NEUTROPHILS NFR BLD AUTO: 56.9 %
NONHDLC SERPL-MCNC: 69 MG/DL
PLATELET # BLD AUTO: 220 K/UL
POTASSIUM SERPL-SCNC: 5.3 MMOL/L
PROT SERPL-MCNC: 7.2 G/DL
RBC # BLD: 5.54 M/UL
RBC # FLD: 14.4 %
SODIUM SERPL-SCNC: 141 MMOL/L
TRIGL SERPL-MCNC: 73 MG/DL
TSH SERPL-ACNC: 4.42 UIU/ML
WBC # FLD AUTO: 8.14 K/UL

## 2023-02-03 PROCEDURE — 93000 ELECTROCARDIOGRAM COMPLETE: CPT

## 2023-02-03 PROCEDURE — 99204 OFFICE O/P NEW MOD 45 MIN: CPT

## 2023-02-03 RX ORDER — ASPIRIN ENTERIC COATED TABLETS 81 MG 81 MG/1
81 TABLET, DELAYED RELEASE ORAL
Qty: 30 | Refills: 0 | Status: COMPLETED | COMMUNITY
Start: 2017-04-09 | End: 2023-02-03

## 2023-02-03 NOTE — DISCUSSION/SUMMARY
[With Me] : with me [___ Week(s)] : in [unfilled] week(s) [FreeTextEntry1] : \par  hypertension: Blood pressure is uncontrolled.  Mr. Roa insists that his blood pressure is normally much lower and does not want me to titrate medications today.  I am concerned about poorly controlled hypertension and asked him to return to see me in 1 week sthenic to recheck blood pressure and titrate medications as appropriate.  In the interim, continue present doses of carvedilol and losartan–HCTZ.\par \par   Leg edema: Predominant etiology unclear–probably multifactorial; patient was referred to a local laboratory for routine blood testing, including metabolic panel; can use furosemide if labs are acceptable.\par \par   Coronary artery disease: Remote myocardial infarction; no angina; today's ECG is abnormal but similar to a prior tracing performed in this office several years ago;   Continue aspirin, atorvastatin, carv edilol; check lipid panel.\par \par   Cardiac murmur: Cardiac auscultation suggest the presence of aortic stenosis; return for echocardiography.

## 2023-02-03 NOTE — PHYSICAL EXAM
[No Acute Distress] : no acute distress [Obese] : obese [Normal S1, S2] : normal S1, S2 [Clear Lung Fields] : clear lung fields [Abnormal Gait] : abnormal gait [No Rash] : no rash [Normal Speech] : normal speech [Alert and Oriented] : alert and oriented [de-identified] : Extraocular muscles intact [de-identified] :  wearing a facemask [de-identified] :  no JVD [de-identified] : II/VI systolic murmur at base [de-identified] : Bilateral leg edema to thighs

## 2023-02-03 NOTE — HISTORY OF PRESENT ILLNESS
[FreeTextEntry1] : Dilan Roa is a 73-year-old man with a history of hypertension, hyperlipidemia, CAD, anterior wall myocardial infarction (2017), ischemic cardiomyopathy, diabetes mellitus who presents for cardiology consultation–he was previously under the care of my former associate, Dr. Lee (retired) --last visit was September 2017.\par \par He presents to the office today because of edema–he describes approximately 1 to 2 weeks of worsening bilateral leg edema that does not seem to be accompanied by dyspnea;  no clear exacerbating or alleviating factors; he describes a recently poor diet.  He has not been experiencing angina.  He describes a sedentary lifestyle.  He has been under the care of Dr. Krystian Zeng.

## 2023-02-03 NOTE — CARDIOLOGY SUMMARY
[de-identified] : 2/3/23.  Sinus  Rhythm.  Old inferior infarct.  Old anterior infarct.  Nonspecific T-abnormality.  [de-identified] : 4/6/2017.  Cardiac catheterization performed the setting of STEMI.  Mid and distal LAD: 100% stenosis; drug-eluting stents were deployed.  Left main: Normal. LCx & RCA: Minor luminal irregularities.  LVEF 40%.

## 2023-02-03 NOTE — REVIEW OF SYSTEMS
[Weight Loss (___ Lbs)] : [unfilled] ~Ulb weight loss [Chest Discomfort] : no chest discomfort [Lower Ext Edema] : lower extremity edema [Negative] : Heme/Lymph

## 2023-02-09 ENCOUNTER — APPOINTMENT (OUTPATIENT)
Dept: CARDIOLOGY | Facility: CLINIC | Age: 73
End: 2023-02-09

## 2023-02-10 ENCOUNTER — APPOINTMENT (OUTPATIENT)
Dept: CARDIOLOGY | Facility: CLINIC | Age: 73
End: 2023-02-10
Payer: MEDICARE

## 2023-02-10 VITALS
OXYGEN SATURATION: 94 % | BODY MASS INDEX: 40.88 KG/M2 | HEIGHT: 69 IN | DIASTOLIC BLOOD PRESSURE: 82 MMHG | WEIGHT: 276 LBS | HEART RATE: 63 BPM | SYSTOLIC BLOOD PRESSURE: 124 MMHG

## 2023-02-10 VITALS
HEART RATE: 68 BPM | BODY MASS INDEX: 40.88 KG/M2 | WEIGHT: 276 LBS | RESPIRATION RATE: 14 BRPM | HEIGHT: 69 IN | OXYGEN SATURATION: 97 %

## 2023-02-10 VITALS — DIASTOLIC BLOOD PRESSURE: 94 MMHG | SYSTOLIC BLOOD PRESSURE: 166 MMHG

## 2023-02-10 DIAGNOSIS — K21.9 GASTRO-ESOPHAGEAL REFLUX DISEASE W/OUT ESOPHAGITIS: ICD-10-CM

## 2023-02-10 DIAGNOSIS — R01.1 CARDIAC MURMUR, UNSPECIFIED: ICD-10-CM

## 2023-02-10 PROCEDURE — 99214 OFFICE O/P EST MOD 30 MIN: CPT

## 2023-02-10 RX ORDER — LOSARTAN POTASSIUM AND HYDROCHLOROTHIAZIDE 12.5; 5 MG/1; MG/1
50-12.5 TABLET ORAL DAILY
Qty: 90 | Refills: 1 | Status: COMPLETED | COMMUNITY
End: 2023-02-10

## 2023-02-10 NOTE — DISCUSSION/SUMMARY
[With Me] : with me [___ Month(s)] : in [unfilled] month(s) [FreeTextEntry1] : \par Hypertension: Uncontrolled; increase carvedilol to 25 mg twice daily; will consider adding a diuretic at future visit if persistent elevation of blood pressure.\par \par Leg edema: Chronic; labs are normal;   I suggested leg elevation, weight loss, reduced sodium diet;   Will assess right heart function with echocardiography (patient missed appointment but will reschedule)\par \par Coronary artery disease: Remote myocardial infarction; no angina; Continue aspirin, atorvastatin, carvedilol; lipids are controlled.\par \par Cardiac murmur: Cardiac auscultation suggest the presence of aortic stenosis; await echocardiography.

## 2023-02-10 NOTE — REVIEW OF SYSTEMS
[Lower Ext Edema] : lower extremity edema [Negative] : Heme/Lymph [SOB] : no shortness of breath [Chest Discomfort] : no chest discomfort [Dizziness] : no dizziness

## 2023-02-10 NOTE — PHYSICAL EXAM
[Obese] : obese [Normal S1, S2] : normal S1, S2 [Clear Lung Fields] : clear lung fields [Abnormal Gait] : abnormal gait [No Rash] : no rash [Normal Speech] : normal speech [Alert and Oriented] : alert and oriented [de-identified] : Extraocular muscles intact [de-identified] :  wearing a facemask [de-identified] :  no JVD [de-identified] : II/VI systolic murmur at base [de-identified] : Bilateral leg edema to thighs

## 2023-02-10 NOTE — HISTORY OF PRESENT ILLNESS
[FreeTextEntry1] : Dilan Roa is a 73-year-old man with a history of hypertension, hyperlipidemia, CAD, anterior wall myocardial infarction (2017), ischemic cardiomyopathy, diabetes mellitus who returns for cardiac examination after establishing care with me last week.  At that time his blood pressure was high but he was reluctant to titrate medications and I asked him to return for follow-up.  He has had the recent onset of bilateral leg edema; labs from 1 week ago revealed a normal metabolic panel, normal CBC, and well-controlled cholesterol.  \par \par He has no new complaints -- reports good adherence with reported Rx.  His wife reports a very sedentary lifestyle.\par \par He missed an appointment for echocardiography (yesterday).

## 2023-02-10 NOTE — CARDIOLOGY SUMMARY
[de-identified] : 2/3/23.  Sinus  Rhythm.  Old inferior infarct.  Old anterior infarct.  Nonspecific T-abnormality.  [de-identified] : 4/6/2017.  Cardiac catheterization performed the setting of STEMI.  Mid and distal LAD: 100% stenosis; drug-eluting stents were deployed.  Left main: Normal. LCx & RCA: Minor luminal irregularities.  LVEF 40%.

## 2023-03-02 ENCOUNTER — APPOINTMENT (OUTPATIENT)
Dept: CARDIOLOGY | Facility: CLINIC | Age: 73
End: 2023-03-02

## 2023-03-03 ENCOUNTER — APPOINTMENT (OUTPATIENT)
Dept: CARDIOLOGY | Facility: CLINIC | Age: 73
End: 2023-03-03
Payer: MEDICARE

## 2023-03-03 DIAGNOSIS — I35.8 OTHER NONRHEUMATIC AORTIC VALVE DISORDERS: ICD-10-CM

## 2023-03-03 PROCEDURE — 93306 TTE W/DOPPLER COMPLETE: CPT

## 2023-03-05 PROBLEM — I35.8 AORTIC VALVE SCLEROSIS: Status: ACTIVE | Noted: 2023-03-05

## 2023-03-06 ENCOUNTER — RX CHANGE (OUTPATIENT)
Age: 73
End: 2023-03-06

## 2023-03-24 ENCOUNTER — APPOINTMENT (OUTPATIENT)
Dept: CT IMAGING | Facility: CLINIC | Age: 73
End: 2023-03-24
Payer: MEDICARE

## 2023-03-24 ENCOUNTER — OUTPATIENT (OUTPATIENT)
Dept: OUTPATIENT SERVICES | Facility: HOSPITAL | Age: 73
LOS: 1 days | End: 2023-03-24
Payer: MEDICARE

## 2023-03-24 DIAGNOSIS — I77.810 THORACIC AORTIC ECTASIA: ICD-10-CM

## 2023-03-24 PROCEDURE — 71275 CT ANGIOGRAPHY CHEST: CPT | Mod: 26,MH

## 2023-03-24 PROCEDURE — 71275 CT ANGIOGRAPHY CHEST: CPT

## 2023-03-31 ENCOUNTER — APPOINTMENT (OUTPATIENT)
Dept: CARDIOLOGY | Facility: CLINIC | Age: 73
End: 2023-03-31

## 2023-03-31 ENCOUNTER — NON-APPOINTMENT (OUTPATIENT)
Age: 73
End: 2023-03-31

## 2023-05-05 ENCOUNTER — NON-APPOINTMENT (OUTPATIENT)
Age: 73
End: 2023-05-05

## 2023-05-05 ENCOUNTER — APPOINTMENT (OUTPATIENT)
Dept: CARDIOLOGY | Facility: CLINIC | Age: 73
End: 2023-05-05
Payer: MEDICARE

## 2023-05-05 VITALS
DIASTOLIC BLOOD PRESSURE: 88 MMHG | BODY MASS INDEX: 40.91 KG/M2 | SYSTOLIC BLOOD PRESSURE: 156 MMHG | HEART RATE: 70 BPM | OXYGEN SATURATION: 94 % | WEIGHT: 277 LBS

## 2023-05-05 VITALS — SYSTOLIC BLOOD PRESSURE: 148 MMHG | DIASTOLIC BLOOD PRESSURE: 82 MMHG

## 2023-05-05 DIAGNOSIS — I25.5 ISCHEMIC CARDIOMYOPATHY: ICD-10-CM

## 2023-05-05 DIAGNOSIS — I77.810 THORACIC AORTIC ECTASIA: ICD-10-CM

## 2023-05-05 PROCEDURE — 93000 ELECTROCARDIOGRAM COMPLETE: CPT

## 2023-05-05 PROCEDURE — 99214 OFFICE O/P EST MOD 30 MIN: CPT

## 2023-05-05 NOTE — REVIEW OF SYSTEMS
[Negative] : Heme/Lymph [Weight Loss (___ Lbs)] : no recent weight loss [SOB] : no shortness of breath [Chest Discomfort] : no chest discomfort [Lower Ext Edema] : no extremity edema [Dizziness] : no dizziness [FreeTextEntry5] : Improved leg edema

## 2023-05-05 NOTE — HISTORY OF PRESENT ILLNESS
[FreeTextEntry1] : Dilan Roa is a 73-year-old man with a history of hypertension, hyperlipidemia, CAD, anterior wall myocardial infarction (2017), ischemic cardiomyopathy, diabetes mellitus who returns for cardiac examination after recently establishing care with me.  During our last encounter on February 10 his blood pressure was uncontrolled, and I increased his dose of carvedilol to 25 mg twice daily. He missed 2 appointments with me (March 10, 2023 and March 31, 2023).\par \par He feels well–no new complaints; tolerating pharmacotherapy; has not been successful with weight loss but intends to try harder in the upcoming months.

## 2023-05-05 NOTE — DISCUSSION/SUMMARY
[With Me] : with me [___ Month(s)] : in [unfilled] month(s) [FreeTextEntry1] : \par Hypertension:   Slowly improving; continue present regimen and add HCTZ 25 mg once daily.  I stressed the importance of concomitant weight loss.\par \par Leg edema: Chronic - modest improvement.\par \par Coronary artery disease: Remote myocardial infarction; stable; no angina;  mild LV dysfunction; continue aspirin, atorvastatin, carvedilol.\par \par Ascending aortic aneurysm: Moderate; blood pressure is improving in response to recent titrations of pharmacotherapy–medications adjusted again today.\par

## 2023-05-05 NOTE — CARDIOLOGY SUMMARY
[de-identified] : 5/5/23.  Sinus  Rhythm \par  Old anteroseptal infarct. \par  Nonspecific T-abnormality.  [de-identified] : 3/3/2023.  Technically very difficult study with poor image quality.  Left ventricular endocardium is not well visualized; grossly, very limited views suggest mild segmental systolic dysfunction with severe hypokinesis of the mid to apical anteroseptum/inferoseptum, apex, and anterior wall.  LVEF estimated at 45 to 50%.  Mildly dilated aortic root (3.9 cm) and ascending aorta (4.5 cm).  Aortic valve sclerosis. [de-identified] : 4/6/2017.  Cardiac catheterization performed the setting of STEMI.  Mid and distal LAD: 100% stenosis; drug-eluting stents were deployed.  Left main: Normal. LCx & RCA: Minor luminal irregularities.  LVEF 40%.

## 2023-05-05 NOTE — PHYSICAL EXAM
[Obese] : obese [Normal S1, S2] : normal S1, S2 [Clear Lung Fields] : clear lung fields [Abnormal Gait] : abnormal gait [Alert and Oriented] : alert and oriented [de-identified] : Extraocular muscles intact [de-identified] : II/VI systolic murmur [de-identified] : Mild leg edema

## 2023-08-07 ENCOUNTER — APPOINTMENT (OUTPATIENT)
Dept: INTERNAL MEDICINE | Facility: CLINIC | Age: 73
End: 2023-08-07
Payer: MEDICARE

## 2023-08-07 ENCOUNTER — NON-APPOINTMENT (OUTPATIENT)
Age: 73
End: 2023-08-07

## 2023-08-07 VITALS — WEIGHT: 272 LBS | BODY MASS INDEX: 40.17 KG/M2 | DIASTOLIC BLOOD PRESSURE: 64 MMHG | SYSTOLIC BLOOD PRESSURE: 118 MMHG

## 2023-08-07 DIAGNOSIS — E11.59 TYPE 2 DIABETES MELLITUS WITH OTHER CIRCULATORY COMPLICATIONS: ICD-10-CM

## 2023-08-07 DIAGNOSIS — F34.1 DYSTHYMIC DISORDER: ICD-10-CM

## 2023-08-07 DIAGNOSIS — Z12.5 ENCOUNTER FOR SCREENING FOR MALIGNANT NEOPLASM OF PROSTATE: ICD-10-CM

## 2023-08-07 DIAGNOSIS — M10.9 GOUT, UNSPECIFIED: ICD-10-CM

## 2023-08-07 DIAGNOSIS — E66.9 OBESITY, UNSPECIFIED: ICD-10-CM

## 2023-08-07 DIAGNOSIS — I25.10 ATHEROSCLEROTIC HEART DISEASE OF NATIVE CORONARY ARTERY W/OUT ANGINA PECTORIS: ICD-10-CM

## 2023-08-07 DIAGNOSIS — E78.5 HYPERLIPIDEMIA, UNSPECIFIED: ICD-10-CM

## 2023-08-07 DIAGNOSIS — F41.9 ANXIETY DISORDER, UNSPECIFIED: ICD-10-CM

## 2023-08-07 DIAGNOSIS — Z00.00 ENCOUNTER FOR GENERAL ADULT MEDICAL EXAMINATION W/OUT ABNORMAL FINDINGS: ICD-10-CM

## 2023-08-07 PROCEDURE — G0439: CPT

## 2023-08-07 PROCEDURE — G0402 INITIAL PREVENTIVE EXAM: CPT

## 2023-08-07 PROCEDURE — 36415 COLL VENOUS BLD VENIPUNCTURE: CPT

## 2023-08-07 NOTE — HISTORY OF PRESENT ILLNESS
[de-identified] : 73-year-old male presents for initial visit to establish care as well as his annual wellness visit, follow-up fasting labs and prescription renewals. No longer seeing his previous doctor(Azucena) who is in [place] since they moved to this area and getting there is difficult. He has a history of CVA 20 years ago secondary to hemorrhage, CAD/MI/PTCA in 2017, ischemic card myopathy, hyperlipidemia, hypertension, mild diabetes, obesity and chronic venous insufficiency with swelling, depression/anxiety previously on Lexapro. He does state that right lower leg swelling has increased over the past month or so with erythema and small wounds that at times drain and also come and go.  Did have recent follow-up with cardiology.

## 2023-08-07 NOTE — HEALTH RISK ASSESSMENT
[No] : No [No falls in past year] : Patient reported no falls in the past year [Medical reason not done] : Medical reason not done [Patient reported colonoscopy was normal] : Patient reported colonoscopy was normal [Never] : Never [de-identified] : History of anxiety/depression previously on Lexapro [ColonoscopyDate] : 01/21

## 2023-08-07 NOTE — PHYSICAL EXAM
[No Acute Distress] : no acute distress [No JVD] : no jugular venous distention [Clear to Auscultation] : lungs were clear to auscultation bilaterally [Normal Rate] : normal rate  [Regular Rhythm] : with a regular rhythm [Alert and Oriented x3] : oriented to person, place, and time [de-identified] : 2/6 systolic murmur [de-identified] : Right lower leg with swelling and erythema as well as multiple small areas of irritation [de-identified] : Central obesity

## 2023-08-07 NOTE — REVIEW OF SYSTEMS
[Fatigue] : fatigue [Fever] : no fever [Chest Pain] : no chest pain [Shortness Of Breath] : no shortness of breath [Dyspnea on Exertion] : not dyspnea on exertion [Abdominal Pain] : no abdominal pain [Dysuria] : no dysuria [Muscle Weakness] : no muscle weakness [Headache] : no headache [Dizziness] : no dizziness

## 2023-08-08 ENCOUNTER — APPOINTMENT (OUTPATIENT)
Dept: DERMATOLOGY | Facility: CLINIC | Age: 73
End: 2023-08-08
Payer: MEDICARE

## 2023-08-08 LAB
ALBUMIN SERPL ELPH-MCNC: 4.1 G/DL
ALP BLD-CCNC: 93 U/L
ALT SERPL-CCNC: 12 U/L
ANION GAP SERPL CALC-SCNC: 15 MMOL/L
AST SERPL-CCNC: 15 U/L
BILIRUB SERPL-MCNC: 1 MG/DL
BUN SERPL-MCNC: 28 MG/DL
CALCIUM SERPL-MCNC: 9.4 MG/DL
CHLORIDE SERPL-SCNC: 97 MMOL/L
CHOLEST SERPL-MCNC: 136 MG/DL
CO2 SERPL-SCNC: 28 MMOL/L
CREAT SERPL-MCNC: 1.22 MG/DL
EGFR: 63 ML/MIN/1.73M2
GLUCOSE SERPL-MCNC: 140 MG/DL
HDLC SERPL-MCNC: 28 MG/DL
LDLC SERPL CALC-MCNC: 92 MG/DL
NONHDLC SERPL-MCNC: 108 MG/DL
POTASSIUM SERPL-SCNC: 4.4 MMOL/L
PROT SERPL-MCNC: 7.2 G/DL
PSA SERPL-MCNC: 0.38 NG/ML
SODIUM SERPL-SCNC: 140 MMOL/L
T4 SERPL-MCNC: 6.7 UG/DL
TRIGL SERPL-MCNC: 84 MG/DL
TSH SERPL-ACNC: 4.39 UIU/ML
URATE SERPL-MCNC: 12 MG/DL

## 2023-08-08 PROCEDURE — 99204 OFFICE O/P NEW MOD 45 MIN: CPT

## 2023-08-08 RX ORDER — TRIAMCINOLONE ACETONIDE 1 MG/G
0.1 OINTMENT TOPICAL
Qty: 1 | Refills: 2 | Status: ACTIVE | COMMUNITY
Start: 2023-08-08 | End: 1900-01-01

## 2023-08-08 NOTE — HISTORY OF PRESENT ILLNESS
[de-identified] : Pt. c/o problems on R leg;  significant swelling recently;   takes water pill; HCTZ, swelling has decreased over last few weeks concerned with infection

## 2023-08-08 NOTE — PHYSICAL EXAM
[FreeTextEntry3] : Legs: + significant edema and stasis changes B/L; much more severe on right, with multiple shallow erosions  no warmth, non tender

## 2023-08-08 NOTE — ASSESSMENT
[FreeTextEntry1] : stasis exacerbation;  with significant edema, evidence of recent hydrostatic bullae no signs infection   Therapeutic options and their risks and benefits; along with multiple diagnostic possibilities were discussed at length; risks and benefits of further study were discussed;  education- continue HCTZ as per PMD TAC 0.1 ointment BID x 2 weeks, then switch to vaseline f/u 1 month;

## 2023-09-07 ENCOUNTER — APPOINTMENT (OUTPATIENT)
Dept: CARDIOLOGY | Facility: CLINIC | Age: 73
End: 2023-09-07
Payer: MEDICARE

## 2023-09-07 VITALS
BODY MASS INDEX: 39.25 KG/M2 | HEIGHT: 69 IN | SYSTOLIC BLOOD PRESSURE: 124 MMHG | HEART RATE: 65 BPM | DIASTOLIC BLOOD PRESSURE: 68 MMHG | OXYGEN SATURATION: 97 % | WEIGHT: 265 LBS

## 2023-09-07 DIAGNOSIS — I10 ESSENTIAL (PRIMARY) HYPERTENSION: ICD-10-CM

## 2023-09-07 DIAGNOSIS — Z00.00 ENCOUNTER FOR GENERAL ADULT MEDICAL EXAMINATION W/OUT ABNORMAL FINDINGS: ICD-10-CM

## 2023-09-07 DIAGNOSIS — R60.0 LOCALIZED EDEMA: ICD-10-CM

## 2023-09-07 PROCEDURE — 93000 ELECTROCARDIOGRAM COMPLETE: CPT

## 2023-09-07 PROCEDURE — 99214 OFFICE O/P EST MOD 30 MIN: CPT | Mod: 25

## 2023-09-07 RX ORDER — OMEPRAZOLE 40 MG/1
CAPSULE, DELAYED RELEASE ORAL
Refills: 0 | Status: ACTIVE | COMMUNITY

## 2023-09-07 RX ORDER — FAMOTIDINE 40 MG/1
40 TABLET, FILM COATED ORAL DAILY
Refills: 0 | Status: ACTIVE | COMMUNITY
Start: 2022-11-02

## 2023-09-07 NOTE — DISCUSSION/SUMMARY
[With Me] : with me [___ Month(s)] : in [unfilled] month(s) [FreeTextEntry1] : PT here today to evaluate RLE with increased swelling/erythema open wounds/ draining sores which per patient is becoming progressively worse, concerning for infection in setting of DM have advised ER for further evaluation/ID consult/? ABX   Hypertension:  Pressure controlled    Coronary artery disease: No CP/SOB  Will return to office post hospital evaluation for follow up  [EKG obtained to assist in diagnosis and management of assessed problem(s)] : EKG obtained to assist in diagnosis and management of assessed problem(s)

## 2023-09-07 NOTE — HISTORY OF PRESENT ILLNESS
[FreeTextEntry1] : Dilan Roa is a 73-year-old man with a history of hypertension, hyperlipidemia, CAD, anterior wall myocardial infarction (2017), ischemic cardiomyopathy, diabetes mellitus who presents today with increased RLE swelling/erythema open wounds/ draining sores which per patient is becoming progressively worse, concerning for infection.

## 2023-09-07 NOTE — CARDIOLOGY SUMMARY
[de-identified] : 5/5/23.  Sinus  Rhythm \par   Old anteroseptal infarct. \par   Nonspecific T-abnormality.  [de-identified] : 3/3/2023.  Technically very difficult study with poor image quality.  Left ventricular endocardium is not well visualized; grossly, very limited views suggest mild segmental systolic dysfunction with severe hypokinesis of the mid to apical anteroseptum/inferoseptum, apex, and anterior wall.  LVEF estimated at 45 to 50%.  Mildly dilated aortic root (3.9 cm) and ascending aorta (4.5 cm).  Aortic valve sclerosis. [de-identified] : 4/6/2017.  Cardiac catheterization performed the setting of STEMI.  Mid and distal LAD: 100% stenosis; drug-eluting stents were deployed.  Left main: Normal. LCx & RCA: Minor luminal irregularities.  LVEF 40%.

## 2023-09-07 NOTE — PHYSICAL EXAM
[Normal S1, S2] : normal S1, S2 [Normal] : clear lung fields, good air entry, no respiratory distress [Soft] : abdomen soft [de-identified] : Obese [de-identified] : Use of cane  [de-identified] : 3+ RLE Edema/erythema/multiple open drainging sores

## 2023-09-07 NOTE — PHYSICAL EXAM
[Normal S1, S2] : normal S1, S2 [Normal] : clear lung fields, good air entry, no respiratory distress [Soft] : abdomen soft [de-identified] : Obese [de-identified] : Use of cane  [de-identified] : 3+ RLE Edema/erythema/multiple open drainging sores

## 2023-09-07 NOTE — CARDIOLOGY SUMMARY
[de-identified] : 5/5/23.  Sinus  Rhythm \par   Old anteroseptal infarct. \par   Nonspecific T-abnormality.  [de-identified] : 3/3/2023.  Technically very difficult study with poor image quality.  Left ventricular endocardium is not well visualized; grossly, very limited views suggest mild segmental systolic dysfunction with severe hypokinesis of the mid to apical anteroseptum/inferoseptum, apex, and anterior wall.  LVEF estimated at 45 to 50%.  Mildly dilated aortic root (3.9 cm) and ascending aorta (4.5 cm).  Aortic valve sclerosis. [de-identified] : 4/6/2017.  Cardiac catheterization performed the setting of STEMI.  Mid and distal LAD: 100% stenosis; drug-eluting stents were deployed.  Left main: Normal. LCx & RCA: Minor luminal irregularities.  LVEF 40%.

## 2023-09-26 ENCOUNTER — APPOINTMENT (OUTPATIENT)
Dept: INTERNAL MEDICINE | Facility: CLINIC | Age: 73
End: 2023-09-26
Payer: MEDICARE

## 2023-09-26 VITALS — DIASTOLIC BLOOD PRESSURE: 84 MMHG | BODY MASS INDEX: 39.28 KG/M2 | SYSTOLIC BLOOD PRESSURE: 126 MMHG | WEIGHT: 266 LBS

## 2023-09-26 DIAGNOSIS — L03.115 CELLULITIS OF RIGHT LOWER LIMB: ICD-10-CM

## 2023-09-26 DIAGNOSIS — I87.2 VENOUS INSUFFICIENCY (CHRONIC) (PERIPHERAL): ICD-10-CM

## 2023-09-26 PROCEDURE — 99214 OFFICE O/P EST MOD 30 MIN: CPT

## 2023-09-28 RX ORDER — LOSARTAN POTASSIUM 100 MG/1
100 TABLET, FILM COATED ORAL
Qty: 90 | Refills: 1 | Status: ACTIVE | COMMUNITY
Start: 1900-01-01 | End: 1900-01-01

## 2023-09-28 RX ORDER — CARVEDILOL 25 MG/1
25 TABLET, FILM COATED ORAL
Qty: 180 | Refills: 1 | Status: ACTIVE | COMMUNITY
Start: 2017-04-09 | End: 1900-01-01

## 2023-09-28 RX ORDER — ATORVASTATIN CALCIUM 20 MG/1
20 TABLET, FILM COATED ORAL
Qty: 90 | Refills: 1 | Status: ACTIVE | COMMUNITY
Start: 2017-04-09 | End: 1900-01-01

## 2023-09-28 RX ORDER — ALLOPURINOL 100 MG/1
100 TABLET ORAL
Qty: 90 | Refills: 1 | Status: ACTIVE | COMMUNITY
Start: 1900-01-01 | End: 1900-01-01

## 2023-09-28 RX ORDER — METFORMIN ER 500 MG 500 MG/1
500 TABLET ORAL
Qty: 180 | Refills: 1 | Status: ACTIVE | COMMUNITY
Start: 2016-12-19 | End: 1900-01-01

## 2023-09-28 RX ORDER — HYDROCHLOROTHIAZIDE 25 MG/1
25 TABLET ORAL DAILY
Qty: 90 | Refills: 1 | Status: ACTIVE | COMMUNITY
Start: 2023-05-05 | End: 1900-01-01

## 2023-09-29 ENCOUNTER — OUTPATIENT (OUTPATIENT)
Dept: OUTPATIENT SERVICES | Facility: HOSPITAL | Age: 73
LOS: 1 days | End: 2023-09-29
Payer: MEDICARE

## 2023-09-29 ENCOUNTER — RX RENEWAL (OUTPATIENT)
Age: 73
End: 2023-09-29

## 2023-09-29 ENCOUNTER — INPATIENT (INPATIENT)
Facility: HOSPITAL | Age: 73
LOS: 4 days | Discharge: HOME | DRG: 603 | End: 2023-10-04
Attending: HOSPITALIST | Admitting: FAMILY MEDICINE
Payer: MEDICARE

## 2023-09-29 VITALS — WEIGHT: 259.93 LBS | HEIGHT: 69 IN

## 2023-09-29 DIAGNOSIS — L03.116 CELLULITIS OF LEFT LOWER LIMB: ICD-10-CM

## 2023-09-29 DIAGNOSIS — L03.115 CELLULITIS OF RIGHT LOWER LIMB: ICD-10-CM

## 2023-09-29 DIAGNOSIS — Z23 ENCOUNTER FOR IMMUNIZATION: ICD-10-CM

## 2023-09-29 LAB
ALBUMIN SERPL ELPH-MCNC: 3.7 G/DL — SIGNIFICANT CHANGE UP (ref 3.3–5)
ALP SERPL-CCNC: 99 U/L — SIGNIFICANT CHANGE UP (ref 40–120)
ALT FLD-CCNC: 19 U/L — SIGNIFICANT CHANGE UP (ref 12–78)
ANION GAP SERPL CALC-SCNC: 2 MMOL/L — LOW (ref 5–17)
APPEARANCE UR: ABNORMAL
APTT BLD: 32.5 SEC — SIGNIFICANT CHANGE UP (ref 24.5–35.6)
AST SERPL-CCNC: 14 U/L — LOW (ref 15–37)
BACTERIA # UR AUTO: NEGATIVE — SIGNIFICANT CHANGE UP
BASOPHILS # BLD AUTO: 0.05 K/UL — SIGNIFICANT CHANGE UP (ref 0–0.2)
BASOPHILS NFR BLD AUTO: 0.5 % — SIGNIFICANT CHANGE UP (ref 0–2)
BILIRUB SERPL-MCNC: 1.2 MG/DL — SIGNIFICANT CHANGE UP (ref 0.2–1.2)
BILIRUB UR-MCNC: NEGATIVE — SIGNIFICANT CHANGE UP
BUN SERPL-MCNC: 23 MG/DL — SIGNIFICANT CHANGE UP (ref 7–23)
CALCIUM SERPL-MCNC: 9.5 MG/DL — SIGNIFICANT CHANGE UP (ref 8.5–10.1)
CHLORIDE SERPL-SCNC: 103 MMOL/L — SIGNIFICANT CHANGE UP (ref 96–108)
CO2 SERPL-SCNC: 32 MMOL/L — HIGH (ref 22–31)
COLOR SPEC: YELLOW — SIGNIFICANT CHANGE UP
CREAT SERPL-MCNC: 1.1 MG/DL — SIGNIFICANT CHANGE UP (ref 0.5–1.3)
DIFF PNL FLD: NEGATIVE — SIGNIFICANT CHANGE UP
EGFR: 71 ML/MIN/1.73M2 — SIGNIFICANT CHANGE UP
EOSINOPHIL # BLD AUTO: 0.23 K/UL — SIGNIFICANT CHANGE UP (ref 0–0.5)
EOSINOPHIL NFR BLD AUTO: 2.5 % — SIGNIFICANT CHANGE UP (ref 0–6)
EPI CELLS # UR: NEGATIVE — SIGNIFICANT CHANGE UP
GLUCOSE BLDC GLUCOMTR-MCNC: 88 MG/DL — SIGNIFICANT CHANGE UP (ref 70–99)
GLUCOSE BLDC GLUCOMTR-MCNC: 93 MG/DL — SIGNIFICANT CHANGE UP (ref 70–99)
GLUCOSE SERPL-MCNC: 100 MG/DL — HIGH (ref 70–99)
GLUCOSE UR QL: NEGATIVE — SIGNIFICANT CHANGE UP
HCT VFR BLD CALC: 47.4 % — SIGNIFICANT CHANGE UP (ref 39–50)
HGB BLD-MCNC: 15.4 G/DL — SIGNIFICANT CHANGE UP (ref 13–17)
IMM GRANULOCYTES NFR BLD AUTO: 0.3 % — SIGNIFICANT CHANGE UP (ref 0–0.9)
INR BLD: 1.08 RATIO — SIGNIFICANT CHANGE UP (ref 0.85–1.18)
KETONES UR-MCNC: NEGATIVE — SIGNIFICANT CHANGE UP
LACTATE SERPL-SCNC: 1.3 MMOL/L — SIGNIFICANT CHANGE UP (ref 0.7–2)
LEUKOCYTE ESTERASE UR-ACNC: NEGATIVE — SIGNIFICANT CHANGE UP
LYMPHOCYTES # BLD AUTO: 2.86 K/UL — SIGNIFICANT CHANGE UP (ref 1–3.3)
LYMPHOCYTES # BLD AUTO: 30.7 % — SIGNIFICANT CHANGE UP (ref 13–44)
MCHC RBC-ENTMCNC: 28.4 PG — SIGNIFICANT CHANGE UP (ref 27–34)
MCHC RBC-ENTMCNC: 32.5 GM/DL — SIGNIFICANT CHANGE UP (ref 32–36)
MCV RBC AUTO: 87.3 FL — SIGNIFICANT CHANGE UP (ref 80–100)
MONOCYTES # BLD AUTO: 0.95 K/UL — HIGH (ref 0–0.9)
MONOCYTES NFR BLD AUTO: 10.2 % — SIGNIFICANT CHANGE UP (ref 2–14)
NEUTROPHILS # BLD AUTO: 5.19 K/UL — SIGNIFICANT CHANGE UP (ref 1.8–7.4)
NEUTROPHILS NFR BLD AUTO: 55.8 % — SIGNIFICANT CHANGE UP (ref 43–77)
NITRITE UR-MCNC: NEGATIVE — SIGNIFICANT CHANGE UP
PH UR: 5 — SIGNIFICANT CHANGE UP (ref 5–8)
PLATELET # BLD AUTO: 204 K/UL — SIGNIFICANT CHANGE UP (ref 150–400)
POTASSIUM SERPL-MCNC: 3.7 MMOL/L — SIGNIFICANT CHANGE UP (ref 3.5–5.3)
POTASSIUM SERPL-SCNC: 3.7 MMOL/L — SIGNIFICANT CHANGE UP (ref 3.5–5.3)
PROT SERPL-MCNC: 8 GM/DL — SIGNIFICANT CHANGE UP (ref 6–8.3)
PROT UR-MCNC: NEGATIVE — SIGNIFICANT CHANGE UP
PROTHROM AB SERPL-ACNC: 12.2 SEC — SIGNIFICANT CHANGE UP (ref 9.5–13)
RBC # BLD: 5.43 M/UL — SIGNIFICANT CHANGE UP (ref 4.2–5.8)
RBC # FLD: 14.9 % — HIGH (ref 10.3–14.5)
RBC CASTS # UR COMP ASSIST: NEGATIVE /HPF — SIGNIFICANT CHANGE UP (ref 0–4)
SODIUM SERPL-SCNC: 137 MMOL/L — SIGNIFICANT CHANGE UP (ref 135–145)
SP GR SPEC: 1.01 — SIGNIFICANT CHANGE UP (ref 1.01–1.02)
UROBILINOGEN FLD QL: NEGATIVE — SIGNIFICANT CHANGE UP
WBC # BLD: 9.31 K/UL — SIGNIFICANT CHANGE UP (ref 3.8–10.5)
WBC # FLD AUTO: 9.31 K/UL — SIGNIFICANT CHANGE UP (ref 3.8–10.5)
WBC UR QL: NEGATIVE /HPF — SIGNIFICANT CHANGE UP (ref 0–5)

## 2023-09-29 PROCEDURE — 82962 GLUCOSE BLOOD TEST: CPT

## 2023-09-29 PROCEDURE — 99497 ADVNCD CARE PLAN 30 MIN: CPT | Mod: 25

## 2023-09-29 PROCEDURE — 87086 URINE CULTURE/COLONY COUNT: CPT

## 2023-09-29 PROCEDURE — 90662 IIV NO PRSV INCREASED AG IM: CPT

## 2023-09-29 PROCEDURE — 85730 THROMBOPLASTIN TIME PARTIAL: CPT

## 2023-09-29 PROCEDURE — 97116 GAIT TRAINING THERAPY: CPT | Mod: GP

## 2023-09-29 PROCEDURE — 97162 PT EVAL MOD COMPLEX 30 MIN: CPT | Mod: GP

## 2023-09-29 PROCEDURE — 81001 URINALYSIS AUTO W/SCOPE: CPT

## 2023-09-29 PROCEDURE — 83735 ASSAY OF MAGNESIUM: CPT

## 2023-09-29 PROCEDURE — 99223 1ST HOSP IP/OBS HIGH 75: CPT

## 2023-09-29 PROCEDURE — 87040 BLOOD CULTURE FOR BACTERIA: CPT

## 2023-09-29 PROCEDURE — 80053 COMPREHEN METABOLIC PANEL: CPT

## 2023-09-29 PROCEDURE — 73590 X-RAY EXAM OF LOWER LEG: CPT | Mod: 26,RT

## 2023-09-29 PROCEDURE — 90471 IMMUNIZATION ADMIN: CPT

## 2023-09-29 PROCEDURE — 93010 ELECTROCARDIOGRAM REPORT: CPT

## 2023-09-29 PROCEDURE — 71045 X-RAY EXAM CHEST 1 VIEW: CPT | Mod: 26

## 2023-09-29 PROCEDURE — 93971 EXTREMITY STUDY: CPT | Mod: 26,RT

## 2023-09-29 PROCEDURE — 80048 BASIC METABOLIC PNL TOTAL CA: CPT

## 2023-09-29 PROCEDURE — 80202 ASSAY OF VANCOMYCIN: CPT

## 2023-09-29 PROCEDURE — 85610 PROTHROMBIN TIME: CPT

## 2023-09-29 PROCEDURE — 99285 EMERGENCY DEPT VISIT HI MDM: CPT | Mod: FS

## 2023-09-29 PROCEDURE — 85025 COMPLETE CBC W/AUTO DIFF WBC: CPT

## 2023-09-29 PROCEDURE — 83036 HEMOGLOBIN GLYCOSYLATED A1C: CPT

## 2023-09-29 PROCEDURE — 36415 COLL VENOUS BLD VENIPUNCTURE: CPT

## 2023-09-29 PROCEDURE — 85027 COMPLETE CBC AUTOMATED: CPT

## 2023-09-29 PROCEDURE — 86803 HEPATITIS C AB TEST: CPT

## 2023-09-29 PROCEDURE — 99204 OFFICE O/P NEW MOD 45 MIN: CPT

## 2023-09-29 RX ORDER — ESCITALOPRAM OXALATE 10 MG/1
10 TABLET, FILM COATED ORAL DAILY
Refills: 0 | Status: DISCONTINUED | OUTPATIENT
Start: 2023-09-29 | End: 2023-10-04

## 2023-09-29 RX ORDER — OMEPRAZOLE 10 MG/1
1 CAPSULE, DELAYED RELEASE ORAL
Qty: 0 | Refills: 0 | DISCHARGE

## 2023-09-29 RX ORDER — GLUCAGON INJECTION, SOLUTION 0.5 MG/.1ML
1 INJECTION, SOLUTION SUBCUTANEOUS ONCE
Refills: 0 | Status: DISCONTINUED | OUTPATIENT
Start: 2023-09-29 | End: 2023-10-04

## 2023-09-29 RX ORDER — CLOPIDOGREL BISULFATE 75 MG/1
1 TABLET, FILM COATED ORAL
Refills: 0 | DISCHARGE

## 2023-09-29 RX ORDER — CEFEPIME 1 G/1
1000 INJECTION, POWDER, FOR SOLUTION INTRAMUSCULAR; INTRAVENOUS EVERY 8 HOURS
Refills: 0 | Status: DISCONTINUED | OUTPATIENT
Start: 2023-09-30 | End: 2023-10-04

## 2023-09-29 RX ORDER — SODIUM CHLORIDE 9 MG/ML
1000 INJECTION, SOLUTION INTRAVENOUS
Refills: 0 | Status: DISCONTINUED | OUTPATIENT
Start: 2023-09-29 | End: 2023-10-04

## 2023-09-29 RX ORDER — ENOXAPARIN SODIUM 100 MG/ML
40 INJECTION SUBCUTANEOUS EVERY 12 HOURS
Refills: 0 | Status: DISCONTINUED | OUTPATIENT
Start: 2023-09-29 | End: 2023-10-04

## 2023-09-29 RX ORDER — DEXTROSE 50 % IN WATER 50 %
15 SYRINGE (ML) INTRAVENOUS ONCE
Refills: 0 | Status: DISCONTINUED | OUTPATIENT
Start: 2023-09-29 | End: 2023-10-04

## 2023-09-29 RX ORDER — VANCOMYCIN HCL 1 G
1750 VIAL (EA) INTRAVENOUS EVERY 12 HOURS
Refills: 0 | Status: DISCONTINUED | OUTPATIENT
Start: 2023-09-29 | End: 2023-09-30

## 2023-09-29 RX ORDER — INSULIN LISPRO 100/ML
VIAL (ML) SUBCUTANEOUS
Refills: 0 | Status: DISCONTINUED | OUTPATIENT
Start: 2023-09-29 | End: 2023-10-04

## 2023-09-29 RX ORDER — PIPERACILLIN AND TAZOBACTAM 4; .5 G/20ML; G/20ML
3.38 INJECTION, POWDER, LYOPHILIZED, FOR SOLUTION INTRAVENOUS ONCE
Refills: 0 | Status: COMPLETED | OUTPATIENT
Start: 2023-09-29 | End: 2023-09-29

## 2023-09-29 RX ORDER — ALLOPURINOL 300 MG
1 TABLET ORAL
Qty: 0 | Refills: 0 | DISCHARGE

## 2023-09-29 RX ORDER — VANCOMYCIN HCL 1 G
1750 VIAL (EA) INTRAVENOUS ONCE
Refills: 0 | Status: COMPLETED | OUTPATIENT
Start: 2023-09-29 | End: 2023-09-29

## 2023-09-29 RX ORDER — ATORVASTATIN CALCIUM 80 MG/1
20 TABLET, FILM COATED ORAL AT BEDTIME
Refills: 0 | Status: DISCONTINUED | OUTPATIENT
Start: 2023-09-29 | End: 2023-10-04

## 2023-09-29 RX ORDER — CLOPIDOGREL BISULFATE 75 MG/1
75 TABLET, FILM COATED ORAL DAILY
Refills: 0 | Status: DISCONTINUED | OUTPATIENT
Start: 2023-09-29 | End: 2023-10-04

## 2023-09-29 RX ORDER — ACETAMINOPHEN 500 MG
650 TABLET ORAL ONCE
Refills: 0 | Status: COMPLETED | OUTPATIENT
Start: 2023-09-29 | End: 2023-09-29

## 2023-09-29 RX ORDER — DEXTROSE 50 % IN WATER 50 %
25 SYRINGE (ML) INTRAVENOUS ONCE
Refills: 0 | Status: DISCONTINUED | OUTPATIENT
Start: 2023-09-29 | End: 2023-10-04

## 2023-09-29 RX ORDER — GABAPENTIN 400 MG/1
0 CAPSULE ORAL
Qty: 0 | Refills: 0 | DISCHARGE

## 2023-09-29 RX ORDER — GABAPENTIN 400 MG/1
300 CAPSULE ORAL DAILY
Refills: 0 | Status: DISCONTINUED | OUTPATIENT
Start: 2023-09-29 | End: 2023-10-04

## 2023-09-29 RX ORDER — CANAGLIFLOZIN AND METFORMIN HYDROCHLORIDE 50; 500 MG/1; MG/1
1 TABLET, FILM COATED, EXTENDED RELEASE ORAL
Qty: 0 | Refills: 0 | DISCHARGE

## 2023-09-29 RX ORDER — DEXTROSE 50 % IN WATER 50 %
12.5 SYRINGE (ML) INTRAVENOUS ONCE
Refills: 0 | Status: DISCONTINUED | OUTPATIENT
Start: 2023-09-29 | End: 2023-10-04

## 2023-09-29 RX ORDER — RIVASTIGMINE 4.6 MG/24H
1 PATCH, EXTENDED RELEASE TRANSDERMAL
Qty: 0 | Refills: 0 | DISCHARGE

## 2023-09-29 RX ORDER — ACETAMINOPHEN 500 MG
650 TABLET ORAL EVERY 6 HOURS
Refills: 0 | Status: DISCONTINUED | OUTPATIENT
Start: 2023-09-29 | End: 2023-10-04

## 2023-09-29 RX ORDER — CEFEPIME 1 G/1
INJECTION, POWDER, FOR SOLUTION INTRAMUSCULAR; INTRAVENOUS
Refills: 0 | Status: DISCONTINUED | OUTPATIENT
Start: 2023-09-29 | End: 2023-09-29

## 2023-09-29 RX ORDER — GABAPENTIN 400 MG/1
1 CAPSULE ORAL
Refills: 0 | DISCHARGE

## 2023-09-29 RX ORDER — LACTOBACILLUS ACIDOPHILUS 100MM CELL
1 CAPSULE ORAL
Refills: 0 | Status: DISCONTINUED | OUTPATIENT
Start: 2023-09-29 | End: 2023-10-04

## 2023-09-29 RX ORDER — DOXYCYCLINE HYCLATE 100 MG/1
100 CAPSULE ORAL
Qty: 30 | Refills: 0 | Status: ACTIVE | COMMUNITY
Start: 2023-09-26 | End: 1900-01-01

## 2023-09-29 RX ORDER — INFLUENZA VIRUS VACCINE 15; 15; 15; 15 UG/.5ML; UG/.5ML; UG/.5ML; UG/.5ML
0.7 SUSPENSION INTRAMUSCULAR ONCE
Refills: 0 | Status: COMPLETED | OUTPATIENT
Start: 2023-09-29 | End: 2023-10-04

## 2023-09-29 RX ORDER — CARVEDILOL PHOSPHATE 80 MG/1
25 CAPSULE, EXTENDED RELEASE ORAL EVERY 12 HOURS
Refills: 0 | Status: DISCONTINUED | OUTPATIENT
Start: 2023-09-29 | End: 2023-10-04

## 2023-09-29 RX ORDER — ALLOPURINOL 300 MG
100 TABLET ORAL DAILY
Refills: 0 | Status: DISCONTINUED | OUTPATIENT
Start: 2023-09-29 | End: 2023-10-04

## 2023-09-29 RX ORDER — ATORVASTATIN CALCIUM 80 MG/1
1 TABLET, FILM COATED ORAL
Refills: 0 | DISCHARGE

## 2023-09-29 RX ORDER — METFORMIN HYDROCHLORIDE 850 MG/1
1 TABLET ORAL
Refills: 0 | DISCHARGE

## 2023-09-29 RX ORDER — HYDROCHLOROTHIAZIDE 25 MG
1 TABLET ORAL
Refills: 0 | DISCHARGE

## 2023-09-29 RX ORDER — ALLOPURINOL 300 MG
1 TABLET ORAL
Refills: 0 | DISCHARGE

## 2023-09-29 RX ORDER — OXYCODONE HYDROCHLORIDE 5 MG/1
2.5 TABLET ORAL EVERY 4 HOURS
Refills: 0 | Status: DISCONTINUED | OUTPATIENT
Start: 2023-09-29 | End: 2023-10-04

## 2023-09-29 RX ORDER — CEFEPIME 1 G/1
INJECTION, POWDER, FOR SOLUTION INTRAMUSCULAR; INTRAVENOUS
Refills: 0 | Status: DISCONTINUED | OUTPATIENT
Start: 2023-09-29 | End: 2023-10-04

## 2023-09-29 RX ORDER — FAMOTIDINE 10 MG/ML
40 INJECTION INTRAVENOUS AT BEDTIME
Refills: 0 | Status: DISCONTINUED | OUTPATIENT
Start: 2023-09-29 | End: 2023-10-04

## 2023-09-29 RX ORDER — ESCITALOPRAM OXALATE 10 MG/1
1 TABLET, FILM COATED ORAL
Qty: 0 | Refills: 0 | DISCHARGE

## 2023-09-29 RX ORDER — LOSARTAN POTASSIUM 100 MG/1
100 TABLET, FILM COATED ORAL DAILY
Refills: 0 | Status: DISCONTINUED | OUTPATIENT
Start: 2023-09-29 | End: 2023-10-04

## 2023-09-29 RX ORDER — ESCITALOPRAM OXALATE 10 MG/1
1 TABLET, FILM COATED ORAL
Refills: 0 | DISCHARGE

## 2023-09-29 RX ORDER — LOSARTAN POTASSIUM 100 MG/1
1 TABLET, FILM COATED ORAL
Refills: 0 | DISCHARGE

## 2023-09-29 RX ORDER — CEFEPIME 1 G/1
1000 INJECTION, POWDER, FOR SOLUTION INTRAMUSCULAR; INTRAVENOUS ONCE
Refills: 0 | Status: COMPLETED | OUTPATIENT
Start: 2023-09-29 | End: 2023-09-29

## 2023-09-29 RX ORDER — CARVEDILOL PHOSPHATE 80 MG/1
1 CAPSULE, EXTENDED RELEASE ORAL
Refills: 0 | DISCHARGE

## 2023-09-29 RX ORDER — FAMOTIDINE 10 MG/ML
1 INJECTION INTRAVENOUS
Refills: 0 | DISCHARGE

## 2023-09-29 RX ADMIN — CEFEPIME 1000 MILLIGRAM(S): 1 INJECTION, POWDER, FOR SOLUTION INTRAMUSCULAR; INTRAVENOUS at 18:55

## 2023-09-29 RX ADMIN — FAMOTIDINE 40 MILLIGRAM(S): 10 INJECTION INTRAVENOUS at 22:07

## 2023-09-29 RX ADMIN — ATORVASTATIN CALCIUM 20 MILLIGRAM(S): 80 TABLET, FILM COATED ORAL at 22:06

## 2023-09-29 RX ADMIN — Medication 250 MILLIGRAM(S): at 18:19

## 2023-09-29 RX ADMIN — ENOXAPARIN SODIUM 40 MILLIGRAM(S): 100 INJECTION SUBCUTANEOUS at 22:06

## 2023-09-29 RX ADMIN — PIPERACILLIN AND TAZOBACTAM 200 GRAM(S): 4; .5 INJECTION, POWDER, LYOPHILIZED, FOR SOLUTION INTRAVENOUS at 17:41

## 2023-09-29 RX ADMIN — Medication 650 MILLIGRAM(S): at 17:41

## 2023-09-29 RX ADMIN — Medication 650 MILLIGRAM(S): at 22:05

## 2023-09-29 NOTE — ED STATDOCS - NS ED ATTENDING STATEMENT MOD
This was a shared visit with the DIONNA. I reviewed and verified the documentation and independently performed the documented:

## 2023-09-29 NOTE — ED ADULT NURSE NOTE - OBJECTIVE STATEMENT
72 y/o male presents to ED c/o right lower leg pain. Pt states that it started a few months ago. Pt was admitted and treated with IV antibiotics. Pt states that he "used a foot scrub on the area which made the sores worse." Pt reports dizziness, headache. Pt denies chest pain, SOB, N/V/D, fevers/chills.

## 2023-09-29 NOTE — H&P ADULT - HISTORY OF PRESENT ILLNESS
Patient is a 73-year-old male with a past medical history of hypertension, diabetes type 2 non-insulin-dependent, hyperlipidemia, stroke, STEMI, gout, AKOSUA, who presents from the wound care clinic for IV antibiotics after failure with outpatient treatment on Keflex and is currently on third day of doxycycline.  Patient states that he has been seeing Dr. Sullivan and wound care, states wound originally improved however has been worsening over past week with enlargement of redness and swelling.     Review of systems: Patient endorses increased swelling, pain, redness to lower extremity.  Denies chest pain shortness of breath nausea vomiting diarrhea abdominal pain constipation dysuria hematuria rash headache.

## 2023-09-29 NOTE — ED STATDOCS - NSICDXFAMILYHX_GEN_ALL_CORE_FT
FAMILY HISTORY:  Father  Still living? Unknown  Family history of emphysema, Age at diagnosis: Age Unknown  Family history of schizophrenia, Age at diagnosis: Age Unknown

## 2023-09-29 NOTE — PATIENT PROFILE ADULT - FALL HARM RISK - HARM RISK INTERVENTIONS
Assistance with ambulation/Assistance OOB with selected safe patient handling equipment/Communicate Risk of Fall with Harm to all staff/Discuss with provider need for PT consult/Monitor gait and stability/Reinforce activity limits and safety measures with patient and family/Tailored Fall Risk Interventions/Visual Cue: Yellow wristband and red socks/Bed in lowest position, wheels locked, appropriate side rails in place/Call bell, personal items and telephone in reach/Instruct patient to call for assistance before getting out of bed or chair/Non-slip footwear when patient is out of bed/[place] to call system/Physically safe environment - no spills, clutter or unnecessary equipment/Purposeful Proactive Rounding/Room/bathroom lighting operational, light cord in reach

## 2023-09-29 NOTE — ED STATDOCS - PROGRESS NOTE DETAILS
74 y/o M with PMH of DM, CVA, HTN, gout presents with right leg pain, swelling and redness. Currently being treated at wound care center, has been on doxycycline with no improvement. Was advised at wound care center today to go to ED for likely admission to hospital. Pt denies trauma, fever, chills, numbness/tingling. PE: Disheveled appearing. Cardiac: s1s2, RRR. Lungs: CTAB. ABdomen: NBS x4, soft, nontender. PV: +right LE erythema, borders demarcated with pen from wound care visit. Erythema extends from proximal right foot to right proximal leg just below knee. Patient ambulatory. A/P: cellulitis, plan for labs, IV antibiotics, admission. - Sukumar Arrington PA-C Pt to be admitted to  for IV antibiotics. -  Sukumar Arrington PA-C

## 2023-09-29 NOTE — ED STATDOCS - OBJECTIVE STATEMENT
74 y/o male with PMHx of DM, CVA, AKOSUA, HTN, gout presents to ED from wound care clinic for IV abx for worsening right leg redness. Wound care MD concerned about possible cellulitis. Endorses headache at this time. Pt had recent infection and was placed on doxycycline with failure to outpatient therapy. 72 y/o male with PMHx of DM, CVA, AKOSUA, HTN, gout presents to ED from wound care clinic for IV abx for worsening right leg redness. Wound care MD sent to admit for IV antibiotics; failure of outpt tx; and was placed on doxycycline with failure to outpatient therapy

## 2023-09-29 NOTE — ED STATDOCS - NSICDXPASTMEDICALHX_GEN_ALL_CORE_FT
PAST MEDICAL HISTORY:  Depression     Gout     HTN - Hypertension     AKOSUA (Obstructive Sleep Apnea)     Personal History of CVA (Cerebrovascular Accident)

## 2023-09-29 NOTE — H&P ADULT - ASSESSMENT
Cellulitis  Failure of outpatient treatment    Unsteady gait     DMII, non insulin dependent with hyperglycemia  Obesity    CAD/HTN/h/o CVA/HLD    Gout    GERD    Anxiety             Patient is a 73-year-old male who presents to ED for admission due to:    #Cellulitis  #Failure of outpatient treatment  Admit to [facility]  Pain control  Tylenol as needed fever  Wound care consult  Ice and elevate extremity while in bed  Activity–out of bed and ambulate with assistance  Continue with broad-spectrum antibiotics  Tight glucose control in setting of diabetes history  ID consult  PT consult  Lovenox for DVT prophylaxis    #Unsteady gait  Secondary to pain from lower extremity cellulitis  Ultrasound negative for DVT  Fall precautions    #Diabetes type 2 non-insulin-dependent with hyperglycemia  #Obesity  Hold metformin  AISS  Carb restriction  Accu-Cheks before every meal at bedtime    #CAD/hypertension/CVA/hyperlipidemia  DASH/TLC diet  Continue Plavix  Continue Coreg  Continue Lipitor  Continue losartan    #Gout  Continue allopurinol    #GERD  Continue with famotidine  Probiotic started per patient request

## 2023-09-29 NOTE — ED ADULT NURSE NOTE - BREATHING, MLM
How Severe Is Your Rash?: mild
Is This A New Presentation, Or A Follow-Up?: Rash
Additional History: Family history of psoriasis
Spontaneous, unlabored and symmetrical

## 2023-09-29 NOTE — H&P ADULT - MUSCULOSKELETAL
decreased ROM due to pain/strength 5/5 bilateral upper extremities/strength 5/5 bilateral lower extremities

## 2023-09-29 NOTE — ED ADULT TRIAGE NOTE - CHIEF COMPLAINT QUOTE
Pt. presents to ED from wound care clinic c/o right leg redness. Wound care Dr concerned about possible cellulitis. Pt. had recent infection and was on abx and said it was "getting better".

## 2023-09-29 NOTE — ED ADULT NURSE NOTE - NSFALLHARMRISKINTERV_ED_ALL_ED
Assistance OOB with selected safe patient handling equipment if applicable/Assistance with ambulation/Communicate risk of Fall with Harm to all staff, patient, and family/Encourage patient to sit up slowly, dangle for a short time, stand at bedside before walking/Monitor gait and stability/Orthostatic vital signs/Provide patient with walking aids/Provide visual cue: red socks, yellow wristband, yellow gown, etc/Reinforce activity limits and safety measures with patient and family/Bed in lowest position, wheels locked, appropriate side rails in place/Call bell, personal items and telephone in reach/Instruct patient to call for assistance before getting out of bed/chair/stretcher/Non-slip footwear applied when patient is off stretcher/[place] to call system/Physically safe environment - no spills, clutter or unnecessary equipment/Purposeful Proactive Rounding/Room/bathroom lighting operational, light cord in reach

## 2023-09-29 NOTE — ED STATDOCS - ATTENDING APP SHARED VISIT CONTRIBUTION OF CARE
I, Courtney Mcdaniel DO,  performed the initial face to face bedside interview with this patient regarding history of present illness, review of symptoms and relevant past medical, social and family history.  I completed an independent physical examination.  I was the initial provider who evaluated this patient.   I personally saw the patient and performed a substantive portion of the visit including all aspects of the medical decision making.  I have signed out the follow up of any pending tests (i.e. labs, radiological studies) to the ACP.  I have communicated the patient’s plan of care and disposition with the ACP.  The history, relevant review of systems, past medical and surgical history, medical decision making, and physical examination was documented by the scribe in my presence and I attest to the accuracy of the documentation.

## 2023-09-29 NOTE — H&P ADULT - TIME BILLING
A minimum of 75 min was spent completing this admission not including time spent discussing advanced care planning or discussing goals of care with a minimum of 36 min spent face to face with patient while in ED unit on admission, counseling patient on current acute on chronic conditions and discussing plan and coordination of care including wound care, IV abx, evaluation by ID due to failure of tx as outpatient on abx, and wound care.

## 2023-09-30 LAB
A1C WITH ESTIMATED AVERAGE GLUCOSE RESULT: 6.4 % — HIGH (ref 4–5.6)
ALBUMIN SERPL ELPH-MCNC: 3.3 G/DL — SIGNIFICANT CHANGE UP (ref 3.3–5)
ALP SERPL-CCNC: 84 U/L — SIGNIFICANT CHANGE UP (ref 40–120)
ALT FLD-CCNC: 15 U/L — SIGNIFICANT CHANGE UP (ref 12–78)
ANION GAP SERPL CALC-SCNC: 4 MMOL/L — LOW (ref 5–17)
APTT BLD: 33.2 SEC — SIGNIFICANT CHANGE UP (ref 24.5–35.6)
AST SERPL-CCNC: 12 U/L — LOW (ref 15–37)
BASOPHILS # BLD AUTO: 0.05 K/UL — SIGNIFICANT CHANGE UP (ref 0–0.2)
BASOPHILS NFR BLD AUTO: 0.8 % — SIGNIFICANT CHANGE UP (ref 0–2)
BILIRUB SERPL-MCNC: 1.1 MG/DL — SIGNIFICANT CHANGE UP (ref 0.2–1.2)
BUN SERPL-MCNC: 24 MG/DL — HIGH (ref 7–23)
CALCIUM SERPL-MCNC: 9.2 MG/DL — SIGNIFICANT CHANGE UP (ref 8.5–10.1)
CHLORIDE SERPL-SCNC: 105 MMOL/L — SIGNIFICANT CHANGE UP (ref 96–108)
CO2 SERPL-SCNC: 29 MMOL/L — SIGNIFICANT CHANGE UP (ref 22–31)
CREAT SERPL-MCNC: 0.97 MG/DL — SIGNIFICANT CHANGE UP (ref 0.5–1.3)
EGFR: 82 ML/MIN/1.73M2 — SIGNIFICANT CHANGE UP
EOSINOPHIL # BLD AUTO: 0.23 K/UL — SIGNIFICANT CHANGE UP (ref 0–0.5)
EOSINOPHIL NFR BLD AUTO: 3.5 % — SIGNIFICANT CHANGE UP (ref 0–6)
ESTIMATED AVERAGE GLUCOSE: 137 MG/DL — HIGH (ref 68–114)
GLUCOSE BLDC GLUCOMTR-MCNC: 109 MG/DL — HIGH (ref 70–99)
GLUCOSE BLDC GLUCOMTR-MCNC: 125 MG/DL — HIGH (ref 70–99)
GLUCOSE BLDC GLUCOMTR-MCNC: 98 MG/DL — SIGNIFICANT CHANGE UP (ref 70–99)
GLUCOSE BLDC GLUCOMTR-MCNC: 99 MG/DL — SIGNIFICANT CHANGE UP (ref 70–99)
GLUCOSE SERPL-MCNC: 110 MG/DL — HIGH (ref 70–99)
HCT VFR BLD CALC: 43.2 % — SIGNIFICANT CHANGE UP (ref 39–50)
HCV AB S/CO SERPL IA: 0.05 S/CO — SIGNIFICANT CHANGE UP (ref 0–0.99)
HCV AB SERPL-IMP: SIGNIFICANT CHANGE UP
HGB BLD-MCNC: 14 G/DL — SIGNIFICANT CHANGE UP (ref 13–17)
IMM GRANULOCYTES NFR BLD AUTO: 0.5 % — SIGNIFICANT CHANGE UP (ref 0–0.9)
INR BLD: 1.11 RATIO — SIGNIFICANT CHANGE UP (ref 0.85–1.18)
LYMPHOCYTES # BLD AUTO: 2.53 K/UL — SIGNIFICANT CHANGE UP (ref 1–3.3)
LYMPHOCYTES # BLD AUTO: 38.9 % — SIGNIFICANT CHANGE UP (ref 13–44)
MCHC RBC-ENTMCNC: 28.1 PG — SIGNIFICANT CHANGE UP (ref 27–34)
MCHC RBC-ENTMCNC: 32.4 GM/DL — SIGNIFICANT CHANGE UP (ref 32–36)
MCV RBC AUTO: 86.6 FL — SIGNIFICANT CHANGE UP (ref 80–100)
MONOCYTES # BLD AUTO: 0.81 K/UL — SIGNIFICANT CHANGE UP (ref 0–0.9)
MONOCYTES NFR BLD AUTO: 12.5 % — SIGNIFICANT CHANGE UP (ref 2–14)
NEUTROPHILS # BLD AUTO: 2.85 K/UL — SIGNIFICANT CHANGE UP (ref 1.8–7.4)
NEUTROPHILS NFR BLD AUTO: 43.8 % — SIGNIFICANT CHANGE UP (ref 43–77)
PLATELET # BLD AUTO: 165 K/UL — SIGNIFICANT CHANGE UP (ref 150–400)
POTASSIUM SERPL-MCNC: 3.7 MMOL/L — SIGNIFICANT CHANGE UP (ref 3.5–5.3)
POTASSIUM SERPL-SCNC: 3.7 MMOL/L — SIGNIFICANT CHANGE UP (ref 3.5–5.3)
PROT SERPL-MCNC: 6.8 GM/DL — SIGNIFICANT CHANGE UP (ref 6–8.3)
PROTHROM AB SERPL-ACNC: 12.5 SEC — SIGNIFICANT CHANGE UP (ref 9.5–13)
RBC # BLD: 4.99 M/UL — SIGNIFICANT CHANGE UP (ref 4.2–5.8)
RBC # FLD: 14.7 % — HIGH (ref 10.3–14.5)
SODIUM SERPL-SCNC: 138 MMOL/L — SIGNIFICANT CHANGE UP (ref 135–145)
WBC # BLD: 6.5 K/UL — SIGNIFICANT CHANGE UP (ref 3.8–10.5)
WBC # FLD AUTO: 6.5 K/UL — SIGNIFICANT CHANGE UP (ref 3.8–10.5)

## 2023-09-30 PROCEDURE — 99232 SBSQ HOSP IP/OBS MODERATE 35: CPT

## 2023-09-30 RX ORDER — VANCOMYCIN HCL 1 G
750 VIAL (EA) INTRAVENOUS EVERY 12 HOURS
Refills: 0 | Status: DISCONTINUED | OUTPATIENT
Start: 2023-09-30 | End: 2023-10-04

## 2023-09-30 RX ADMIN — CLOPIDOGREL BISULFATE 75 MILLIGRAM(S): 75 TABLET, FILM COATED ORAL at 09:07

## 2023-09-30 RX ADMIN — ATORVASTATIN CALCIUM 20 MILLIGRAM(S): 80 TABLET, FILM COATED ORAL at 22:53

## 2023-09-30 RX ADMIN — CEFEPIME 1000 MILLIGRAM(S): 1 INJECTION, POWDER, FOR SOLUTION INTRAMUSCULAR; INTRAVENOUS at 14:49

## 2023-09-30 RX ADMIN — Medication 100 MILLIGRAM(S): at 09:07

## 2023-09-30 RX ADMIN — CEFEPIME 1000 MILLIGRAM(S): 1 INJECTION, POWDER, FOR SOLUTION INTRAMUSCULAR; INTRAVENOUS at 22:53

## 2023-09-30 RX ADMIN — LOSARTAN POTASSIUM 100 MILLIGRAM(S): 100 TABLET, FILM COATED ORAL at 09:06

## 2023-09-30 RX ADMIN — Medication 1 TABLET(S): at 17:42

## 2023-09-30 RX ADMIN — Medication 250 MILLIGRAM(S): at 06:26

## 2023-09-30 RX ADMIN — GABAPENTIN 300 MILLIGRAM(S): 400 CAPSULE ORAL at 09:10

## 2023-09-30 RX ADMIN — ESCITALOPRAM OXALATE 10 MILLIGRAM(S): 10 TABLET, FILM COATED ORAL at 09:08

## 2023-09-30 RX ADMIN — ENOXAPARIN SODIUM 40 MILLIGRAM(S): 100 INJECTION SUBCUTANEOUS at 22:53

## 2023-09-30 RX ADMIN — Medication 250 MILLIGRAM(S): at 22:53

## 2023-09-30 RX ADMIN — CEFEPIME 1000 MILLIGRAM(S): 1 INJECTION, POWDER, FOR SOLUTION INTRAMUSCULAR; INTRAVENOUS at 06:27

## 2023-09-30 RX ADMIN — FAMOTIDINE 40 MILLIGRAM(S): 10 INJECTION INTRAVENOUS at 22:54

## 2023-09-30 RX ADMIN — CARVEDILOL PHOSPHATE 25 MILLIGRAM(S): 80 CAPSULE, EXTENDED RELEASE ORAL at 17:42

## 2023-09-30 RX ADMIN — Medication 1 TABLET(S): at 09:08

## 2023-09-30 RX ADMIN — ENOXAPARIN SODIUM 40 MILLIGRAM(S): 100 INJECTION SUBCUTANEOUS at 09:07

## 2023-09-30 NOTE — CONSULT NOTE ADULT - ASSESSMENT
74 y/o male with h/o hypertension, diabetes type 2 non-insulin-dependent, hyperlipidemia, stroke, STEMI, gout, AKOSUA was admitted on 9/29 from the wound care clinic for increased, nonhealing right foot and leg erythema. He was sent for IV antibiotics after failure with outpatient treatment on Keflex and is currently on third day of doxycycline.  Patient states that he has been seeing Dr. Sullivan and wound care, states wound originally improved however has been worsening over past week with enlargement of redness and swelling. Patient endorses increased swelling, pain, redness to right lower extremity. In ER he received cefepime and vancomycin IV.     1. Right lower leg cellulitis.   72 y/o male with h/o hypertension, diabetes type 2 non-insulin-dependent, hyperlipidemia, stroke, STEMI, gout, AKOSUA was admitted on 9/29 from the wound care clinic for increased, nonhealing right foot and leg erythema. He was sent for IV antibiotics after failure with outpatient treatment on Keflex and is currently on third day of doxycycline. Patient states that he has been seeing Dr. Sullivan and wound care, states wound originally improved however has been worsening over past week with enlargement of redness and swelling. Patient endorses increased swelling, pain, redness to right lower extremity. In ER he received cefepime and vancomycin IV.     1. Right lower leg cellulitis. Probable underlying chronic stasis dermatitis. DM type 2.   -obtain BC x 2, urine c/s  -start vancomycin 750 mg IV q12h and cefepime 1 gm IV q8h  -reason for abx use and side effects reviewed with patient; monitor BMP and vancomycin trough levels  -elevate legs  -old chart reviewed to assess prior cultures  -monitor temps  -f/u CBC  -supportive care  2. Other issues:   -care per medicine

## 2023-09-30 NOTE — CONSULT NOTE ADULT - SUBJECTIVE AND OBJECTIVE BOX
Patient is a 73y old  Male who presents with a chief complaint of Cellulitis with failure of outpatient treatment     HPI:  74 y/o male with h/o hypertension, diabetes type 2 non-insulin-dependent, hyperlipidemia, stroke, STEMI, gout, AKOSUA was admitted on 9/29 from the wound care clinic for increased, nonhealing right foot and leg erythema. He was sent for IV antibiotics after failure with outpatient treatment on Keflex and is currently on third day of doxycycline.  Patient states that he has been seeing Dr. Sullivan and wound care, states wound originally improved however has been worsening over past week with enlargement of redness and swelling. Patient endorses increased swelling, pain, redness to right lower extremity. In ER he received cefepime and vancomycin IV.     PMH: as above  PSH: as above  Meds: per reconciliation sheet, noted below  MEDICATIONS  (STANDING):  allopurinol 100 milliGRAM(s) Oral daily  atorvastatin 20 milliGRAM(s) Oral at bedtime  carvedilol 25 milliGRAM(s) Oral every 12 hours  cefepime  Injectable.      cefepime  Injectable. 1000 milliGRAM(s) IV Push every 8 hours  clopidogrel Tablet 75 milliGRAM(s) Oral daily  dextrose 5%. 1000 milliLiter(s) (50 mL/Hr) IV Continuous <Continuous>  dextrose 5%. 1000 milliLiter(s) (100 mL/Hr) IV Continuous <Continuous>  dextrose 50% Injectable 25 Gram(s) IV Push once  dextrose 50% Injectable 12.5 Gram(s) IV Push once  dextrose 50% Injectable 25 Gram(s) IV Push once  enoxaparin Injectable 40 milliGRAM(s) SubCutaneous every 12 hours  escitalopram 10 milliGRAM(s) Oral daily  famotidine    Tablet 40 milliGRAM(s) Oral at bedtime  gabapentin 300 milliGRAM(s) Oral daily  glucagon  Injectable 1 milliGRAM(s) IntraMuscular once  hydrochlorothiazide 25 milliGRAM(s) Oral daily  influenza  Vaccine (HIGH DOSE) 0.7 milliLiter(s) IntraMuscular once  insulin lispro (ADMELOG) corrective regimen sliding scale   SubCutaneous three times a day before meals  lactobacillus acidophilus 1 Tablet(s) Oral two times a day with meals  losartan 100 milliGRAM(s) Oral daily  vancomycin  IVPB 1750 milliGRAM(s) IV Intermittent every 12 hours    MEDICATIONS  (PRN):  acetaminophen     Tablet .. 650 milliGRAM(s) Oral every 6 hours PRN Temp greater or equal to 38C (100.4F), Mild Pain (1 - 3), Moderate Pain (4 - 6)  dextrose Oral Gel 15 Gram(s) Oral once PRN Blood Glucose LESS THAN 70 milliGRAM(s)/deciliter  oxyCODONE    IR 2.5 milliGRAM(s) Oral every 4 hours PRN Severe Pain (7 - 10)    Allergies    Motrin (Chills)    Intolerances      Social: no smoking, no alcohol, no illegal drugs; no recent travel, no exposure to TB  FAMILY HISTORY:  Family history of schizophrenia (Father)  Family history of emphysema (Father)    ROS: the patient denies fever, no chills, no HA, no seizures, no dizziness, no sore throat, no nasal congestion, no blurry vision, no CP, no palpitations, no SOB, no cough, no abdominal pain, no diarrhea, no N/V, no dysuria, no leg pain, no claudication, no rash, no joint aches, no rectal pain or bleeding, no night sweats  All other systems reviewed and are negative    Vital Signs Last 24 Hrs  T(C): 36.4 (30 Sep 2023 08:23), Max: 36.9 (29 Sep 2023 18:20)  T(F): 97.5 (30 Sep 2023 08:23), Max: 98.5 (29 Sep 2023 18:20)  HR: 55 (30 Sep 2023 08:23) (54 - 59)  BP: 143/78 (30 Sep 2023 08:23) (126/74 - 154/73)  BP(mean): 92 (29 Sep 2023 18:20) (92 - 103)  RR: 18 (30 Sep 2023 08:23) (16 - 19)  SpO2: 97% (30 Sep 2023 08:23) (95% - 100%)    Parameters below as of 30 Sep 2023 08:23  Patient On (Oxygen Delivery Method): room air    Daily Height in cm: 175.26 (29 Sep 2023 13:39)    Daily     PE:    Constitutional:  No acute distress  HEENT: NC/AT, EOMI, PERRLA, conjunctivae clear; ears and nose atraumatic; pharynx benign  Neck: supple; thyroid not palpable  Back: no tenderness  Respiratory: respiratory effort normal; clear to auscultation  Cardiovascular: S1S2 regular, no murmurs  Abdomen: soft, not tender, not distended, positive BS; no liver or spleen organomegaly  Genitourinary: no suprapubic tenderness  Lymphatic: no LN palpable  Musculoskeletal: no muscle tenderness, no joint swelling or tenderness  Extremities: no pedal edema  RLE erythema, edema, warmth and tenderness  Neurological/ Psychiatric: AxOx3, judgement and insight normal; moving all extremities  Skin: no rashes; no palpable lesions    Labs: all available labs reviewed                        14.0   6.50  )-----------( 165      ( 30 Sep 2023 07:28 )             43.2     09-30    138  |  105  |  24<H>  ----------------------------<  110<H>  3.7   |  29  |  0.97    Ca    9.2      30 Sep 2023 07:28    TPro  6.8  /  Alb  3.3  /  TBili  1.1  /  DBili  x   /  AST  12<L>  /  ALT  15  /  AlkPhos  84  09-30     LIVER FUNCTIONS - ( 30 Sep 2023 07:28 )  Alb: 3.3 g/dL / Pro: 6.8 gm/dL / ALK PHOS: 84 U/L / ALT: 15 U/L / AST: 12 U/L / GGT: x           Urinalysis (09-29 @ 17:47)  Urine Appearance: Slightly Turbid  Protein, Urine: Negative    Urine Microscopic-Add On (NC) (09-29 @ 17:47)  White Blood Cell - Urine: Negative /HPF  Red Blood Cell - Urine: Negative /HPF    Radiology: all available radiological tests reviewed    < from: Xray Chest 1 View-PORTABLE IMMEDIATE (09.29.23 @ 16:22) >  IMPRESSION: Low lung volumes. Linear scarring versus atelectasis at the   left lung base. Otherwise, clear lungs.  < end of copied text >    < from: US Duplex Venous Lower Ext Ltd, Right (09.29.23 @ 16:12) >  No evidence of right lower extremity deep venous thrombosis.  < end of copied text >      Advanced directives addressed: full resuscitation Patient is a 73y old  Male who presents with a chief complaint of Cellulitis with failure of outpatient treatment     HPI:  74 y/o male with h/o hypertension, diabetes type 2 non-insulin-dependent, hyperlipidemia, stroke, STEMI, gout, AKOSUA was admitted on 9/29 from the wound care clinic for increased, nonhealing right foot and leg erythema. He was sent for IV antibiotics after failure with outpatient treatment on Keflex and is currently on third day of doxycycline.  Patient states that he has been seeing Dr. Sullivan and wound care, states wound originally improved however has been worsening over past week with enlargement of redness and swelling. Patient endorses increased swelling, pain, redness to right lower extremity. In ER he received cefepime and vancomycin IV.     PMH: as above  PSH: as above  Meds: per reconciliation sheet, noted below  MEDICATIONS  (STANDING):  allopurinol 100 milliGRAM(s) Oral daily  atorvastatin 20 milliGRAM(s) Oral at bedtime  carvedilol 25 milliGRAM(s) Oral every 12 hours  cefepime  Injectable.      cefepime  Injectable. 1000 milliGRAM(s) IV Push every 8 hours  clopidogrel Tablet 75 milliGRAM(s) Oral daily  dextrose 5%. 1000 milliLiter(s) (50 mL/Hr) IV Continuous <Continuous>  dextrose 5%. 1000 milliLiter(s) (100 mL/Hr) IV Continuous <Continuous>  dextrose 50% Injectable 25 Gram(s) IV Push once  dextrose 50% Injectable 12.5 Gram(s) IV Push once  dextrose 50% Injectable 25 Gram(s) IV Push once  enoxaparin Injectable 40 milliGRAM(s) SubCutaneous every 12 hours  escitalopram 10 milliGRAM(s) Oral daily  famotidine    Tablet 40 milliGRAM(s) Oral at bedtime  gabapentin 300 milliGRAM(s) Oral daily  glucagon  Injectable 1 milliGRAM(s) IntraMuscular once  hydrochlorothiazide 25 milliGRAM(s) Oral daily  influenza  Vaccine (HIGH DOSE) 0.7 milliLiter(s) IntraMuscular once  insulin lispro (ADMELOG) corrective regimen sliding scale   SubCutaneous three times a day before meals  lactobacillus acidophilus 1 Tablet(s) Oral two times a day with meals  losartan 100 milliGRAM(s) Oral daily  vancomycin  IVPB 1750 milliGRAM(s) IV Intermittent every 12 hours    MEDICATIONS  (PRN):  acetaminophen     Tablet .. 650 milliGRAM(s) Oral every 6 hours PRN Temp greater or equal to 38C (100.4F), Mild Pain (1 - 3), Moderate Pain (4 - 6)  dextrose Oral Gel 15 Gram(s) Oral once PRN Blood Glucose LESS THAN 70 milliGRAM(s)/deciliter  oxyCODONE    IR 2.5 milliGRAM(s) Oral every 4 hours PRN Severe Pain (7 - 10)    Allergies    Motrin (Chills)    Intolerances      Social: no smoking, no alcohol, no illegal drugs; no recent travel, no exposure to TB  FAMILY HISTORY:  Family history of schizophrenia (Father)  Family history of emphysema (Father)    ROS: the patient denies fever, no chills, no HA, no seizures, no dizziness, no sore throat, no nasal congestion, no blurry vision, no CP, no palpitations, no SOB, no cough, no abdominal pain, no diarrhea, no N/V, no dysuria, no leg pain, no claudication, no rash, no joint aches, no rectal pain or bleeding, no night sweats  All other systems reviewed and are negative    Vital Signs Last 24 Hrs  T(C): 36.4 (30 Sep 2023 08:23), Max: 36.9 (29 Sep 2023 18:20)  T(F): 97.5 (30 Sep 2023 08:23), Max: 98.5 (29 Sep 2023 18:20)  HR: 55 (30 Sep 2023 08:23) (54 - 59)  BP: 143/78 (30 Sep 2023 08:23) (126/74 - 154/73)  BP(mean): 92 (29 Sep 2023 18:20) (92 - 103)  RR: 18 (30 Sep 2023 08:23) (16 - 19)  SpO2: 97% (30 Sep 2023 08:23) (95% - 100%)    Parameters below as of 30 Sep 2023 08:23  Patient On (Oxygen Delivery Method): room air    Daily Height in cm: 175.26 (29 Sep 2023 13:39)    Daily     PE:    Constitutional:  No acute distress  HEENT: NC/AT, EOMI, PERRLA, conjunctivae clear; ears and nose atraumatic; pharynx benign  Neck: supple; thyroid not palpable  Back: no tenderness  Respiratory: respiratory effort normal; clear to auscultation  Cardiovascular: S1S2 regular, no murmurs  Abdomen: soft, not tender, not distended, positive BS; no liver or spleen organomegaly  Genitourinary: no suprapubic tenderness  Lymphatic: no LN palpable  Musculoskeletal: no muscle tenderness, no joint swelling or tenderness  Extremities: 1+ pedal edema  RLE (ankle, calf, shin) erythema, edema, warmth and tenderness  Neurological/ Psychiatric: AxOx3, judgement and insight normal; moving all extremities  Skin: no rashes; no palpable lesions    Labs: all available labs reviewed                        14.0   6.50  )-----------( 165      ( 30 Sep 2023 07:28 )             43.2     09-30    138  |  105  |  24<H>  ----------------------------<  110<H>  3.7   |  29  |  0.97    Ca    9.2      30 Sep 2023 07:28    TPro  6.8  /  Alb  3.3  /  TBili  1.1  /  DBili  x   /  AST  12<L>  /  ALT  15  /  AlkPhos  84  09-30     LIVER FUNCTIONS - ( 30 Sep 2023 07:28 )  Alb: 3.3 g/dL / Pro: 6.8 gm/dL / ALK PHOS: 84 U/L / ALT: 15 U/L / AST: 12 U/L / GGT: x           Urinalysis (09-29 @ 17:47)  Urine Appearance: Slightly Turbid  Protein, Urine: Negative    Urine Microscopic-Add On (NC) (09-29 @ 17:47)  White Blood Cell - Urine: Negative /HPF  Red Blood Cell - Urine: Negative /HPF    Radiology: all available radiological tests reviewed    < from: Xray Chest 1 View-PORTABLE IMMEDIATE (09.29.23 @ 16:22) >  IMPRESSION: Low lung volumes. Linear scarring versus atelectasis at the   left lung base. Otherwise, clear lungs.  < end of copied text >    < from: US Duplex Venous Lower Ext Ltd, Right (09.29.23 @ 16:12) >  No evidence of right lower extremity deep venous thrombosis.  < end of copied text >    Advanced directives addressed: full resuscitation

## 2023-10-01 LAB
GLUCOSE BLDC GLUCOMTR-MCNC: 110 MG/DL — HIGH (ref 70–99)
GLUCOSE BLDC GLUCOMTR-MCNC: 110 MG/DL — HIGH (ref 70–99)
GLUCOSE BLDC GLUCOMTR-MCNC: 117 MG/DL — HIGH (ref 70–99)
GLUCOSE BLDC GLUCOMTR-MCNC: 128 MG/DL — HIGH (ref 70–99)
VANCOMYCIN TROUGH SERPL-MCNC: 12.6 UG/ML — SIGNIFICANT CHANGE UP (ref 10–20)

## 2023-10-01 PROCEDURE — 99232 SBSQ HOSP IP/OBS MODERATE 35: CPT

## 2023-10-01 RX ADMIN — ATORVASTATIN CALCIUM 20 MILLIGRAM(S): 80 TABLET, FILM COATED ORAL at 21:26

## 2023-10-01 RX ADMIN — CEFEPIME 1000 MILLIGRAM(S): 1 INJECTION, POWDER, FOR SOLUTION INTRAMUSCULAR; INTRAVENOUS at 21:28

## 2023-10-01 RX ADMIN — ENOXAPARIN SODIUM 40 MILLIGRAM(S): 100 INJECTION SUBCUTANEOUS at 10:58

## 2023-10-01 RX ADMIN — Medication 650 MILLIGRAM(S): at 06:01

## 2023-10-01 RX ADMIN — LOSARTAN POTASSIUM 100 MILLIGRAM(S): 100 TABLET, FILM COATED ORAL at 10:58

## 2023-10-01 RX ADMIN — Medication 250 MILLIGRAM(S): at 23:22

## 2023-10-01 RX ADMIN — GABAPENTIN 300 MILLIGRAM(S): 400 CAPSULE ORAL at 10:57

## 2023-10-01 RX ADMIN — Medication 1 TABLET(S): at 10:57

## 2023-10-01 RX ADMIN — Medication 1 TABLET(S): at 17:57

## 2023-10-01 RX ADMIN — CEFEPIME 1000 MILLIGRAM(S): 1 INJECTION, POWDER, FOR SOLUTION INTRAMUSCULAR; INTRAVENOUS at 15:23

## 2023-10-01 RX ADMIN — ESCITALOPRAM OXALATE 10 MILLIGRAM(S): 10 TABLET, FILM COATED ORAL at 10:57

## 2023-10-01 RX ADMIN — Medication 250 MILLIGRAM(S): at 11:01

## 2023-10-01 RX ADMIN — Medication 650 MILLIGRAM(S): at 23:31

## 2023-10-01 RX ADMIN — CARVEDILOL PHOSPHATE 25 MILLIGRAM(S): 80 CAPSULE, EXTENDED RELEASE ORAL at 17:56

## 2023-10-01 RX ADMIN — ENOXAPARIN SODIUM 40 MILLIGRAM(S): 100 INJECTION SUBCUTANEOUS at 21:27

## 2023-10-01 RX ADMIN — Medication 100 MILLIGRAM(S): at 10:57

## 2023-10-01 RX ADMIN — CLOPIDOGREL BISULFATE 75 MILLIGRAM(S): 75 TABLET, FILM COATED ORAL at 10:58

## 2023-10-01 RX ADMIN — CEFEPIME 1000 MILLIGRAM(S): 1 INJECTION, POWDER, FOR SOLUTION INTRAMUSCULAR; INTRAVENOUS at 05:43

## 2023-10-01 RX ADMIN — FAMOTIDINE 40 MILLIGRAM(S): 10 INJECTION INTRAVENOUS at 21:26

## 2023-10-02 LAB
GLUCOSE BLDC GLUCOMTR-MCNC: 112 MG/DL — HIGH (ref 70–99)
GLUCOSE BLDC GLUCOMTR-MCNC: 123 MG/DL — HIGH (ref 70–99)
GLUCOSE BLDC GLUCOMTR-MCNC: 170 MG/DL — HIGH (ref 70–99)
GLUCOSE BLDC GLUCOMTR-MCNC: 94 MG/DL — SIGNIFICANT CHANGE UP (ref 70–99)

## 2023-10-02 PROCEDURE — 99232 SBSQ HOSP IP/OBS MODERATE 35: CPT

## 2023-10-02 RX ADMIN — Medication 250 MILLIGRAM(S): at 08:16

## 2023-10-02 RX ADMIN — GABAPENTIN 300 MILLIGRAM(S): 400 CAPSULE ORAL at 08:16

## 2023-10-02 RX ADMIN — CLOPIDOGREL BISULFATE 75 MILLIGRAM(S): 75 TABLET, FILM COATED ORAL at 08:13

## 2023-10-02 RX ADMIN — CARVEDILOL PHOSPHATE 25 MILLIGRAM(S): 80 CAPSULE, EXTENDED RELEASE ORAL at 06:21

## 2023-10-02 RX ADMIN — CEFEPIME 1000 MILLIGRAM(S): 1 INJECTION, POWDER, FOR SOLUTION INTRAMUSCULAR; INTRAVENOUS at 14:22

## 2023-10-02 RX ADMIN — CEFEPIME 1000 MILLIGRAM(S): 1 INJECTION, POWDER, FOR SOLUTION INTRAMUSCULAR; INTRAVENOUS at 21:25

## 2023-10-02 RX ADMIN — CEFEPIME 1000 MILLIGRAM(S): 1 INJECTION, POWDER, FOR SOLUTION INTRAMUSCULAR; INTRAVENOUS at 06:28

## 2023-10-02 RX ADMIN — Medication 650 MILLIGRAM(S): at 17:20

## 2023-10-02 RX ADMIN — Medication 1 TABLET(S): at 08:13

## 2023-10-02 RX ADMIN — OXYCODONE HYDROCHLORIDE 2.5 MILLIGRAM(S): 5 TABLET ORAL at 08:45

## 2023-10-02 RX ADMIN — FAMOTIDINE 40 MILLIGRAM(S): 10 INJECTION INTRAVENOUS at 21:24

## 2023-10-02 RX ADMIN — ENOXAPARIN SODIUM 40 MILLIGRAM(S): 100 INJECTION SUBCUTANEOUS at 21:24

## 2023-10-02 RX ADMIN — OXYCODONE HYDROCHLORIDE 2.5 MILLIGRAM(S): 5 TABLET ORAL at 00:31

## 2023-10-02 RX ADMIN — OXYCODONE HYDROCHLORIDE 2.5 MILLIGRAM(S): 5 TABLET ORAL at 08:13

## 2023-10-02 RX ADMIN — Medication 1 TABLET(S): at 17:21

## 2023-10-02 RX ADMIN — CARVEDILOL PHOSPHATE 25 MILLIGRAM(S): 80 CAPSULE, EXTENDED RELEASE ORAL at 17:21

## 2023-10-02 RX ADMIN — Medication 250 MILLIGRAM(S): at 21:25

## 2023-10-02 RX ADMIN — Medication 650 MILLIGRAM(S): at 00:01

## 2023-10-02 RX ADMIN — OXYCODONE HYDROCHLORIDE 2.5 MILLIGRAM(S): 5 TABLET ORAL at 01:01

## 2023-10-02 RX ADMIN — ENOXAPARIN SODIUM 40 MILLIGRAM(S): 100 INJECTION SUBCUTANEOUS at 08:13

## 2023-10-02 RX ADMIN — ESCITALOPRAM OXALATE 10 MILLIGRAM(S): 10 TABLET, FILM COATED ORAL at 08:14

## 2023-10-02 RX ADMIN — ATORVASTATIN CALCIUM 20 MILLIGRAM(S): 80 TABLET, FILM COATED ORAL at 21:25

## 2023-10-02 RX ADMIN — Medication 100 MILLIGRAM(S): at 08:13

## 2023-10-02 NOTE — PHYSICAL THERAPY INITIAL EVALUATION ADULT - GENERAL OBSERVATIONS, REHAB EVAL
Pt seen on 2S, NAD, willing and cooperative, A and O x 4, very talkative and friendly. R LE cellulitis red and wound healing.

## 2023-10-02 NOTE — PHYSICAL THERAPY INITIAL EVALUATION ADULT - ADDITIONAL COMMENTS
Pt has very short step length, he is stating he is doing that due to his LE status. Pt having a harder time using the RW at this session and not wanting to use it, even when PT told him to use it to decrease the pressure on his R LE.

## 2023-10-02 NOTE — PHYSICAL THERAPY INITIAL EVALUATION ADULT - PERTINENT HX OF CURRENT PROBLEM, REHAB EVAL
Interval H&P    I have seen and evaluated the patient. Since her admission, patient was diagnosed with chronic hypertension with superimposed preeclampsia.     Reports she is feeling well overall. She reports  no contractions, decreased fetal movement, vaginal bleeding, or gush of fluid. She further denies any headache, blurry vision, chest pain, SOB, nausea, vomiting, RUQ  Pain, increased swelling in hands or face.     No subjective fevers, fevers, chills, or night sweats. No dysuria. She is ambulating without difficulty. No lightheadedness or dizziness.     10 point review of systems negative. Pertinent finding above.     Visit Vitals  /90 (BP Location: RUE - Right upper extremity, Patient Position: Semi-Beavers's)   Pulse 79   Temp 98.8 °F (37.1 °C) (Temporal)   Resp 16   Ht 5' 3\" (1.6 m)   Wt 115.9 kg   LMP 03/22/2019   BMI 45.26 kg/m²       PHYSICAL EXAM    GENERAL APEARANCE:  The patient is a pleasant, normal appearing female with normal affect and in no distress.  NECK: supple  LUNGS: Clear bilaterally with normal respiratory effort  HEART:   Regular rate and rhythm.  No murmurs noted.  Pulses are full and symmetrical.  ABDOMEN: Soft, non-tender, non-distended.  No hepatosplenomegaly.  Normal bowel sounds.  No umbilical or inguinal hernias. Prior Pfannenstiel incision well - healed  SKIN:  Warm and dry to touch.  No lesions or rashes noted.     PSYCHIATRIC/NEUROLOGIC:  Appropriate mood and affect, normal recall, alert and oriented x 3  EXTREMITIES:  Warm and well perfused.  No edema noted.  Muscle strength and sensation are normal bilaterally 5/5 in both upper and lower extremities.    :  deferred    PIH LAB RESULTS:  Recent Labs   Lab 02/21/20  1256   HGB 11.5*   HCT 36.3      CREATININE 0.77   AST 21   GPT 15      URIC 5.7   POTASSIUM 3.9     PROTEIN/CREATININE RATIO (mgPR/gCR)   Date Value   12/22/2019 208 (H)     Protein/ Creatinine Ratio (mgPR/gCR)   Date Value   02/10/2020 416 (H)  Pt is a 72 y/o M, who presents from the wound care clinic for IV antibiotics after failure with outpatient treatment on Keflex and is currently on doxycycline, Cellulitis R LE, Ultrasound negative for DVT       Lab Results   Component Value Date    WBC 8.5 2020    HGB 11.5 (L) 2020    HCT 36.3 2020     2020    RUBEL 86.9 2019    HIV NONREACTIVE 2019    VARIC IMMUNE 2019     Blood type:O POSITIVE  Recent Labs   Lab 01/15/20  1240 19  1325 19  1106   ANTIBODY SCREEN  --   --  NEGATIVE   HEP B Surface AG  --   --  NEGATIVE   Treponema pallidum IgG/IgM Ab  --   --  NONREACTIVE   Trichomonas Vaginalis NATHANAEL  --  NEGATIVE  --    Chlamydia Trachomatis by Nucleic Acid Amplification  --  NEGATIVE  --    Neisseria Gonorrhoeae by Nucleic Acid Amplification  --  NEGATIVE  --    GLUC CHALLENGE 1   --   --      GBS: negative 2/10/2020      Recent Labs   Lab 02/10/20  2021 12/22/19  1053   U-Amphetamines  --  NEGATIVE   Amphetamines, Urine Negative  --    U-Barbiturates  --  NEGATIVE   Barbiturates, Urine Negative  --    U-Benzodiazepines  --  NEGATIVE   Benzodiazepines, Urine Negative  --    Cannabinoids (THC) UA  --  NEGATIVE   Cannabinoids, Urine Negative  --    U-Cocaine/Metabolite  --  NEGATIVE   Opiates UA  --  NEGATIVE   Opiates, Urine Negative  --    U-PHENCYCLIDINE  --  NEGATIVE   Phencyclidine, Urine Negative  --          Cephalic by BSUS    A/P:  25 year old  here for elective repeat  section.   Hx of eclampsia  Hx of 2  sections    We thoroughly explained the risks of the procedure, including infection, bleeding, damage to tissues and organs (including uterus, fallopian tubes, ovaries, bowel, bladder, ureters), transfusion (we thoroughly discussed risks, including risk of Hep B, C, and HIV infection; allergic reaction; fever; TRALI; and blood cross reaction), and lastly hysterectomy. Patient was agreeable.     Mirena IUD desired. Counseled of risks, benefits, and alternatives.     Cbc, T/S  Ancef 3g  NPO  Anesthesia to see  Consent in chart  Mirena IUD ordered    CHTN with hx of preeclampsia and eclampsia  - labs this admission  -  continue to monitor for signs and symptoms of preeclampsia   - I/O    PP planning:  -Gender unknown to patient  -,Mirena IUD      Patient was seen and evaluated with Dr. Fitch who agrees with the above plan of care.     Steffi Collins MD PGYIV  [phone]  2/21/2020  1:51 PM        I have seen and evaluated the pt, reviewed the above note, and agree with the findings, assessment, and plan; we will proceed with repeat C/S with bilateral tubal ligation as previously planned; the patient is aware of the risks of surgery including bleeding, risk of blood transfusion, infection, damage to surrounding organs (bowel, bladder, ureters, and vasculature), and risk of hysterectomy in the case that life-threatening bleeding/hemorrhage is encountered; all questions were answered; both verbal and written consent obtained; there are no barriers to learning.    Demetria Fitch, DO

## 2023-10-02 NOTE — PHYSICAL THERAPY INITIAL EVALUATION ADULT - LIVES WITH, PROFILE
Pt lives w/ wife, and has family in the home. Pt states it is a [place] home w/ 1 LUTHER and he does not have any issues with the step.

## 2023-10-02 NOTE — CONSULT NOTE ADULT - ASSESSMENT
A: 72y/o Male seen for the following:   -Right lower extremity cellulitis  -Stasis dermitis  -Difficulty with ambulation     P:   Chart reviewed and Patient evaluated;  Discussed diagnosis and treatment with patient. Discussed importance of daily foot examinations, proper shoe gear, importance of tight glycemic control.   X-rays tib-fib reviewed : on wet read showing no soft tissue gas, no acute cortical disruptions. Official impression noted above.   Wound flush with normal saline  No acute podiatric intervention. Applied bacitracin to few scabs and small superficial skin breakage, stable. No dressings required at this time. Erythema appears to be receding.   WBAT to BLE  Continue with IV antibiotics as per ID  All additional care per Med appreciated  Patient demonstrated verbal understanding of all interventions and tolerated interventions well without any complications.   Podiatry will follow while in house      Case D/W attending Dr. Sullivan

## 2023-10-02 NOTE — PHYSICAL THERAPY INITIAL EVALUATION ADULT - MODALITIES TREATMENT COMMENTS
Pt OOB in chair, NAD, denies pain , Pt will have his wife bring in his WBQC from home for next session. JO, RN aware.

## 2023-10-02 NOTE — CONSULT NOTE ADULT - SUBJECTIVE AND OBJECTIVE BOX
Date of Consult: 10/2/23  HPI: Patient is a 73-year-old male with a past medical history of hypertension, diabetes type 2 non-insulin-dependent, hyperlipidemia, stroke, STEMI, gout, AKOSUA, who presents from the wound care clinic for IV antibiotics after failure with outpatient treatment on Keflex and is currently on third day of doxycycline.  Patient states that he has been seeing Dr. Sullivan and wound care, states wound originally improved however has been worsening over past week with enlargement of redness and swelling.         PMH: Gout    Personal History of CVA (Cerebrovascular Accident)    HTN - Hypertension    AKOSUA (Obstructive Sleep Apnea)    Depression      PSH:History of Tonsillectomy    Appendicitis, Acute, with Peritonitis        Allergies:Motrin (Chills)      Labs:      WBC Trend  6.50 Date (09-30 @ 07:28)  9.31 Date (09-29 @ 15:40)      Chem              T(F): 97.7 (10-02-23 @ 07:56), Max: 97.7 (10-02-23 @ 07:56)  HR: 68 (10-02-23 @ 07:56) (61 - 79)  BP: 108/70 (10-02-23 @ 07:56) (108/70 - 135/76)  RR: 18 (10-02-23 @ 07:56) (18 - 18)  SpO2: 98% (10-02-23 @ 06:19) (93% - 98%)  Wt(kg): --    REVIEW OF SYSTEMS:    CONSTITUTIONAL: No weakness, fevers or chills  EYES: No visual changes  RESPIRATORY: No cough, wheezing; No shortness of breath  CARDIOVASCULAR: No chest pain or palpitations  GASTROINTESTINAL: No abdominal or epigastric pain. No nausea, vomiting; No diarrhea or constipation.   GENITOURINARY: No dysuria, frequency or hematuria  NEUROLOGICAL: No numbness or weakness  SKIN: See physical examination.  All other review of systems is negative unless indicated above    Physical Exam:   Constitutional: NAD, alert;  Lower Extremity Focus  Derm:  Skin warm, dry and supple bilateral.    Noted erythema from above Right ankle extending to approx below tibial tuberosity. Noted scabs and skin breakage to anterior Right lower leg, superficial in nature, no purulence, no fluctuance, no tracking/tunneling, no probe to bone.   Vascular: Dorsalis Pedis weakly palpable 1/4 and Posterior Tibial pulses non palpable  Neuro: Protective sensation diminished to the level of the digits bilateral.  MSK: Muscle strength 5/5 all major muscle groups bilateral.

## 2023-10-03 LAB
CULTURE RESULTS: SIGNIFICANT CHANGE UP
GLUCOSE BLDC GLUCOMTR-MCNC: 114 MG/DL — HIGH (ref 70–99)
GLUCOSE BLDC GLUCOMTR-MCNC: 116 MG/DL — HIGH (ref 70–99)
GLUCOSE BLDC GLUCOMTR-MCNC: 117 MG/DL — HIGH (ref 70–99)
GLUCOSE BLDC GLUCOMTR-MCNC: 123 MG/DL — HIGH (ref 70–99)
SPECIMEN SOURCE: SIGNIFICANT CHANGE UP
VANCOMYCIN TROUGH SERPL-MCNC: 11 UG/ML — SIGNIFICANT CHANGE UP (ref 10–20)

## 2023-10-03 PROCEDURE — 99232 SBSQ HOSP IP/OBS MODERATE 35: CPT

## 2023-10-03 RX ADMIN — CEFEPIME 1000 MILLIGRAM(S): 1 INJECTION, POWDER, FOR SOLUTION INTRAMUSCULAR; INTRAVENOUS at 06:47

## 2023-10-03 RX ADMIN — OXYCODONE HYDROCHLORIDE 2.5 MILLIGRAM(S): 5 TABLET ORAL at 14:10

## 2023-10-03 RX ADMIN — Medication 650 MILLIGRAM(S): at 23:41

## 2023-10-03 RX ADMIN — CEFEPIME 1000 MILLIGRAM(S): 1 INJECTION, POWDER, FOR SOLUTION INTRAMUSCULAR; INTRAVENOUS at 14:06

## 2023-10-03 RX ADMIN — ESCITALOPRAM OXALATE 10 MILLIGRAM(S): 10 TABLET, FILM COATED ORAL at 09:56

## 2023-10-03 RX ADMIN — FAMOTIDINE 40 MILLIGRAM(S): 10 INJECTION INTRAVENOUS at 22:37

## 2023-10-03 RX ADMIN — Medication 1 TABLET(S): at 17:42

## 2023-10-03 RX ADMIN — Medication 100 MILLIGRAM(S): at 09:55

## 2023-10-03 RX ADMIN — LOSARTAN POTASSIUM 100 MILLIGRAM(S): 100 TABLET, FILM COATED ORAL at 09:55

## 2023-10-03 RX ADMIN — ENOXAPARIN SODIUM 40 MILLIGRAM(S): 100 INJECTION SUBCUTANEOUS at 09:54

## 2023-10-03 RX ADMIN — GABAPENTIN 300 MILLIGRAM(S): 400 CAPSULE ORAL at 09:55

## 2023-10-03 RX ADMIN — Medication 250 MILLIGRAM(S): at 22:37

## 2023-10-03 RX ADMIN — CLOPIDOGREL BISULFATE 75 MILLIGRAM(S): 75 TABLET, FILM COATED ORAL at 09:54

## 2023-10-03 RX ADMIN — ATORVASTATIN CALCIUM 20 MILLIGRAM(S): 80 TABLET, FILM COATED ORAL at 22:37

## 2023-10-03 RX ADMIN — ENOXAPARIN SODIUM 40 MILLIGRAM(S): 100 INJECTION SUBCUTANEOUS at 22:37

## 2023-10-03 RX ADMIN — Medication 250 MILLIGRAM(S): at 11:45

## 2023-10-03 RX ADMIN — Medication 1 TABLET(S): at 09:54

## 2023-10-03 RX ADMIN — CEFEPIME 1000 MILLIGRAM(S): 1 INJECTION, POWDER, FOR SOLUTION INTRAMUSCULAR; INTRAVENOUS at 22:38

## 2023-10-03 RX ADMIN — CARVEDILOL PHOSPHATE 25 MILLIGRAM(S): 80 CAPSULE, EXTENDED RELEASE ORAL at 06:47

## 2023-10-04 ENCOUNTER — TRANSCRIPTION ENCOUNTER (OUTPATIENT)
Age: 73
End: 2023-10-04

## 2023-10-04 VITALS
TEMPERATURE: 98 F | RESPIRATION RATE: 18 BRPM | OXYGEN SATURATION: 97 % | HEART RATE: 61 BPM | SYSTOLIC BLOOD PRESSURE: 134 MMHG | DIASTOLIC BLOOD PRESSURE: 75 MMHG

## 2023-10-04 LAB
ANION GAP SERPL CALC-SCNC: 4 MMOL/L — LOW (ref 5–17)
BUN SERPL-MCNC: 25 MG/DL — HIGH (ref 7–23)
CALCIUM SERPL-MCNC: 9.2 MG/DL — SIGNIFICANT CHANGE UP (ref 8.5–10.1)
CHLORIDE SERPL-SCNC: 105 MMOL/L — SIGNIFICANT CHANGE UP (ref 96–108)
CO2 SERPL-SCNC: 29 MMOL/L — SIGNIFICANT CHANGE UP (ref 22–31)
CREAT SERPL-MCNC: 1 MG/DL — SIGNIFICANT CHANGE UP (ref 0.5–1.3)
EGFR: 79 ML/MIN/1.73M2 — SIGNIFICANT CHANGE UP
GLUCOSE BLDC GLUCOMTR-MCNC: 110 MG/DL — HIGH (ref 70–99)
GLUCOSE BLDC GLUCOMTR-MCNC: 111 MG/DL — HIGH (ref 70–99)
GLUCOSE SERPL-MCNC: 116 MG/DL — HIGH (ref 70–99)
HCT VFR BLD CALC: 43.9 % — SIGNIFICANT CHANGE UP (ref 39–50)
HGB BLD-MCNC: 14.1 G/DL — SIGNIFICANT CHANGE UP (ref 13–17)
MAGNESIUM SERPL-MCNC: 2.1 MG/DL — SIGNIFICANT CHANGE UP (ref 1.6–2.6)
MCHC RBC-ENTMCNC: 27.9 PG — SIGNIFICANT CHANGE UP (ref 27–34)
MCHC RBC-ENTMCNC: 32.1 GM/DL — SIGNIFICANT CHANGE UP (ref 32–36)
MCV RBC AUTO: 86.8 FL — SIGNIFICANT CHANGE UP (ref 80–100)
PLATELET # BLD AUTO: 172 K/UL — SIGNIFICANT CHANGE UP (ref 150–400)
POTASSIUM SERPL-MCNC: 3.9 MMOL/L — SIGNIFICANT CHANGE UP (ref 3.5–5.3)
POTASSIUM SERPL-SCNC: 3.9 MMOL/L — SIGNIFICANT CHANGE UP (ref 3.5–5.3)
RBC # BLD: 5.06 M/UL — SIGNIFICANT CHANGE UP (ref 4.2–5.8)
RBC # FLD: 15.1 % — HIGH (ref 10.3–14.5)
SODIUM SERPL-SCNC: 138 MMOL/L — SIGNIFICANT CHANGE UP (ref 135–145)
WBC # BLD: 7.42 K/UL — SIGNIFICANT CHANGE UP (ref 3.8–10.5)
WBC # FLD AUTO: 7.42 K/UL — SIGNIFICANT CHANGE UP (ref 3.8–10.5)

## 2023-10-04 PROCEDURE — 99239 HOSP IP/OBS DSCHRG MGMT >30: CPT

## 2023-10-04 RX ORDER — ATORVASTATIN CALCIUM 80 MG/1
1 TABLET, FILM COATED ORAL
Refills: 0 | DISCHARGE

## 2023-10-04 RX ORDER — ESCITALOPRAM OXALATE 10 MG/1
1 TABLET, FILM COATED ORAL
Refills: 0 | DISCHARGE

## 2023-10-04 RX ORDER — CLOPIDOGREL BISULFATE 75 MG/1
1 TABLET, FILM COATED ORAL
Refills: 0 | DISCHARGE

## 2023-10-04 RX ORDER — METFORMIN HYDROCHLORIDE 850 MG/1
1 TABLET ORAL
Refills: 0 | DISCHARGE

## 2023-10-04 RX ORDER — LOSARTAN POTASSIUM 100 MG/1
1 TABLET, FILM COATED ORAL
Refills: 0 | DISCHARGE

## 2023-10-04 RX ORDER — CARVEDILOL PHOSPHATE 80 MG/1
1 CAPSULE, EXTENDED RELEASE ORAL
Refills: 0 | DISCHARGE

## 2023-10-04 RX ORDER — GABAPENTIN 400 MG/1
1 CAPSULE ORAL
Refills: 0 | DISCHARGE

## 2023-10-04 RX ORDER — ALLOPURINOL 300 MG
1 TABLET ORAL
Refills: 0 | DISCHARGE

## 2023-10-04 RX ORDER — FAMOTIDINE 10 MG/ML
1 INJECTION INTRAVENOUS
Refills: 0 | DISCHARGE

## 2023-10-04 RX ORDER — HYDROCHLOROTHIAZIDE 25 MG
1 TABLET ORAL
Refills: 0 | DISCHARGE

## 2023-10-04 RX ADMIN — Medication 100 MILLIGRAM(S): at 09:47

## 2023-10-04 RX ADMIN — CEFEPIME 1000 MILLIGRAM(S): 1 INJECTION, POWDER, FOR SOLUTION INTRAMUSCULAR; INTRAVENOUS at 06:39

## 2023-10-04 RX ADMIN — ENOXAPARIN SODIUM 40 MILLIGRAM(S): 100 INJECTION SUBCUTANEOUS at 09:48

## 2023-10-04 RX ADMIN — ESCITALOPRAM OXALATE 10 MILLIGRAM(S): 10 TABLET, FILM COATED ORAL at 09:48

## 2023-10-04 RX ADMIN — GABAPENTIN 300 MILLIGRAM(S): 400 CAPSULE ORAL at 09:47

## 2023-10-04 RX ADMIN — CLOPIDOGREL BISULFATE 75 MILLIGRAM(S): 75 TABLET, FILM COATED ORAL at 09:47

## 2023-10-04 RX ADMIN — INFLUENZA VIRUS VACCINE 0.7 MILLILITER(S): 15; 15; 15; 15 SUSPENSION INTRAMUSCULAR at 15:12

## 2023-10-04 RX ADMIN — Medication 100 MILLIGRAM(S): at 14:37

## 2023-10-04 RX ADMIN — LOSARTAN POTASSIUM 100 MILLIGRAM(S): 100 TABLET, FILM COATED ORAL at 09:47

## 2023-10-04 RX ADMIN — Medication 1 TABLET(S): at 09:46

## 2023-10-04 NOTE — DISCHARGE NOTE PROVIDER - HOSPITAL COURSE
73-year-old male with a past medical history of hypertension, diabetes type 2 non-insulin-dependent, hyperlipidemia, stroke, STEMI, gout, AKOSUA, who presents from the wound care clinic for IV antibiotics after failure with outpatient treatment. He is treated with IV abx and now his cellulitis has improved a lot and is being discharged with further management as an outpt. Time spent 45 minutes     Vital Signs Last 24 Hrs  T(C): 36.6 (04 Oct 2023 08:40), Max: 36.9 (03 Oct 2023 23:48)  T(F): 97.9 (04 Oct 2023 08:40), Max: 98.4 (03 Oct 2023 23:48)  HR: 61 (04 Oct 2023 08:40) (61 - 71)  BP: 134/75 (04 Oct 2023 08:40) (103/66 - 134/75)  BP(mean): --  RR: 18 (04 Oct 2023 08:40) (18 - 18)  SpO2: 97% (04 Oct 2023 08:40) (95% - 97%)    Parameters below as of 04 Oct 2023 08:40  Patient On (Oxygen Delivery Method): room air      T(C): 36.6 (10-04-23 @ 08:40), Max: 36.9 (10-03-23 @ 23:48)  HR: 61 (10-04-23 @ 08:40) (61 - 71)  BP: 134/75 (10-04-23 @ 08:40) (103/66 - 134/75)  RR: 18 (10-04-23 @ 08:40) (18 - 18)  SpO2: 97% (10-04-23 @ 08:40) (95% - 97%)    CONSTITUTIONAL: Well groomed, no apparent distress  EYES: PERRLA and symmetric, EOMI, No conjunctival or scleral injection, non-icteric  ENMT: Oral mucosa with moist membranes. Normal dentition; no pharyngeal injection or exudates             NECK: Supple, symmetric and without tracheal deviation   RESP: No respiratory distress, no use of accessory muscles; CTA b/l, no WRR  CV: RRR, +S1S2, no MRG; no JVD; no peripheral edema  GI: Soft, NT, ND, no rebound, no guarding; no palpable masses; no hepatosplenomegaly; no hernia palpated  LYMPH: No cervical LAD or tenderness; no axillary LAD or tenderness; no inguinal LAD or tenderness  MSK: Normal gait; No digital clubbing or cyanosis; examination of the (head/neck/spine/ribs/pelvis, RUE, LUE, RLE, LLE) without misalignment,            Normal ROM without pain, no spinal tenderness, normal muscle strength/tone  SKIN: chronic skin changes present on b/l leg   NEURO: CN II-XII intact; normal reflexes in upper and lower extremities, sensation intact in upper and lower extremities b/l to light touch   PSYCH: Appropriate insight/judgment; A+O x 3, mood and affect appropriate, recent/remote memory intact      10-04-23 @ 13:18
[id number]

## 2023-10-04 NOTE — PROGRESS NOTE ADULT - PROVIDER SPECIALTY LIST ADULT
Hospitalist
Infectious Disease
Infectious Disease
Podiatry
Podiatry
Hospitalist
Infectious Disease
Infectious Disease

## 2023-10-04 NOTE — PROGRESS NOTE ADULT - SUBJECTIVE AND OBJECTIVE BOX
Patient is a 73-year-old male with a past medical history of hypertension, diabetes type 2 non-insulin-dependent, hyperlipidemia, stroke, STEMI, gout, AKOUSA, who presents from the wound care clinic for IV antibiotics after failure with outpatient treatment on Keflex and was on third day of doxycycline.  Patient states that he has been seeing Dr. Sullivan and wound care, states wound originally improved however has been worsening over past week with enlargement of redness and swelling.     10.1: no cp no sob, c/o significant R leg erythema and edema, pain             REVIEW OF SYSTEMS:    CONSTITUTIONAL: No weakness, No fevers or chills  ENT: No ear ache, No sorethroat  NECK: No pain, No stiffness  RESPIRATORY: No cough, No wheezing, No hemoptysis; No dyspnea  CARDIOVASCULAR: No chest pain, No palpitations  GASTROINTESTINAL: No abd pain, No nausea, No vomiting, No hematemesis, No diarrhea or constipation. No melena, No hematochezia.  GENITOURINARY: No dysuria, No  hematuria  NEUROLOGICAL: No diplopia, No paresthesia, No motor dysfunction  MUSCULOSKELETAL: No arthralgia, + myalgia  SKIN: ++ rashes, or lesions   PSYCH: no anxiety, no suicidal ideation    All other review of systems is negative unless indicated above    Vital Signs Last 24 Hrs  T(C): 36.4 (01 Oct 2023 07:38), Max: 36.8 (30 Sep 2023 15:56)  T(F): 97.5 (01 Oct 2023 07:38), Max: 98.2 (30 Sep 2023 15:56)  HR: 68 (01 Oct 2023 07:38) (59 - 73)  BP: 120/81 (01 Oct 2023 07:38) (118/68 - 158/100)  BP(mean): --  RR: 18 (01 Oct 2023 07:38) (17 - 18)  SpO2: 96% (01 Oct 2023 07:38) (94% - 98%)    Parameters below as of 01 Oct 2023 07:38  Patient On (Oxygen Delivery Method): room air        PHYSICAL EXAM:    GENERAL: NAD  HEENT:  NC/AT, EOMI, PERRLA, No scleral icterus, Moist mucous membranes  NECK: Supple, No JVD  CNS:  Alert & Oriented X3, Motor Strength 5/5 B/L upper and lower extremities; DTRs 2+ intact   LUNG: Normal Breath sounds, Clear to auscultation bilaterally, No rales, No rhonchi, No wheezing  HEART: RRR; No murmurs, No rubs  ABDOMEN: +BS, ST/ND/NT  GENITOURINARY: Voiding, Bladder not distended  EXTREMITIES:  2+ Peripheral Pulses, No clubbing, No cyanosis, No tibial edema  MUSCULOSKELTAL: Joints normal ROM, No TTP, No effusion  SKIN: extensive erythema and edema of Rt leg  RECTAL: deferred, not indicated  BREAST: deferred                          14.0   6.50  )-----------( 165      ( 30 Sep 2023 07:28 )             43.2     09-30    138  |  105  |  24<H>  ----------------------------<  110<H>  3.7   |  29  |  0.97    Ca    9.2      30 Sep 2023 07:28    TPro  6.8  /  Alb  3.3  /  TBili  1.1  /  DBili  x   /  AST  12<L>  /  ALT  15  /  AlkPhos  84  09-30    Vancomycin levels:   Cultures:     MEDICATIONS  (STANDING):  allopurinol 100 milliGRAM(s) Oral daily  atorvastatin 20 milliGRAM(s) Oral at bedtime  carvedilol 25 milliGRAM(s) Oral every 12 hours  cefepime  Injectable.      cefepime  Injectable. 1000 milliGRAM(s) IV Push every 8 hours  clopidogrel Tablet 75 milliGRAM(s) Oral daily  dextrose 5%. 1000 milliLiter(s) (50 mL/Hr) IV Continuous <Continuous>  dextrose 5%. 1000 milliLiter(s) (100 mL/Hr) IV Continuous <Continuous>  dextrose 50% Injectable 25 Gram(s) IV Push once  dextrose 50% Injectable 12.5 Gram(s) IV Push once  dextrose 50% Injectable 25 Gram(s) IV Push once  enoxaparin Injectable 40 milliGRAM(s) SubCutaneous every 12 hours  escitalopram 10 milliGRAM(s) Oral daily  famotidine    Tablet 40 milliGRAM(s) Oral at bedtime  gabapentin 300 milliGRAM(s) Oral daily  glucagon  Injectable 1 milliGRAM(s) IntraMuscular once  hydrochlorothiazide 25 milliGRAM(s) Oral daily  influenza  Vaccine (HIGH DOSE) 0.7 milliLiter(s) IntraMuscular once  insulin lispro (ADMELOG) corrective regimen sliding scale   SubCutaneous three times a day before meals  lactobacillus acidophilus 1 Tablet(s) Oral two times a day with meals  losartan 100 milliGRAM(s) Oral daily  vancomycin  IVPB 750 milliGRAM(s) IV Intermittent every 12 hours    MEDICATIONS  (PRN):  acetaminophen     Tablet .. 650 milliGRAM(s) Oral every 6 hours PRN Temp greater or equal to 38C (100.4F), Mild Pain (1 - 3), Moderate Pain (4 - 6)  dextrose Oral Gel 15 Gram(s) Oral once PRN Blood Glucose LESS THAN 70 milliGRAM(s)/deciliter  oxyCODONE    IR 2.5 milliGRAM(s) Oral every 4 hours PRN Severe Pain (7 - 10)      all labs reviewed  all imaging reviewed          
Date of service: 10-01-23 @ 10:02    Lying in bed in NAD  Has right lower leg redness  Few scabs, some broken  Admits scratching leg and breaking the scabs  Local tenderness    ROS: no fever or chills; denies dizziness, no HA, no SOB or cough, no abdominal pain, no diarrhea or constipation; no dysuria    MEDICATIONS  (STANDING):  allopurinol 100 milliGRAM(s) Oral daily  atorvastatin 20 milliGRAM(s) Oral at bedtime  carvedilol 25 milliGRAM(s) Oral every 12 hours  cefepime  Injectable.      cefepime  Injectable. 1000 milliGRAM(s) IV Push every 8 hours  clopidogrel Tablet 75 milliGRAM(s) Oral daily  dextrose 5%. 1000 milliLiter(s) (100 mL/Hr) IV Continuous <Continuous>  dextrose 5%. 1000 milliLiter(s) (50 mL/Hr) IV Continuous <Continuous>  dextrose 50% Injectable 25 Gram(s) IV Push once  dextrose 50% Injectable 25 Gram(s) IV Push once  dextrose 50% Injectable 12.5 Gram(s) IV Push once  enoxaparin Injectable 40 milliGRAM(s) SubCutaneous every 12 hours  escitalopram 10 milliGRAM(s) Oral daily  famotidine    Tablet 40 milliGRAM(s) Oral at bedtime  gabapentin 300 milliGRAM(s) Oral daily  glucagon  Injectable 1 milliGRAM(s) IntraMuscular once  hydrochlorothiazide 25 milliGRAM(s) Oral daily  influenza  Vaccine (HIGH DOSE) 0.7 milliLiter(s) IntraMuscular once  insulin lispro (ADMELOG) corrective regimen sliding scale   SubCutaneous three times a day before meals  lactobacillus acidophilus 1 Tablet(s) Oral two times a day with meals  losartan 100 milliGRAM(s) Oral daily  vancomycin  IVPB 750 milliGRAM(s) IV Intermittent every 12 hours    Vital Signs Last 24 Hrs  T(C): 36.4 (01 Oct 2023 07:38), Max: 36.8 (30 Sep 2023 15:56)  T(F): 97.5 (01 Oct 2023 07:38), Max: 98.2 (30 Sep 2023 15:56)  HR: 68 (01 Oct 2023 07:38) (59 - 73)  BP: 120/81 (01 Oct 2023 07:38) (118/68 - 158/100)  BP(mean): --  RR: 18 (01 Oct 2023 07:38) (17 - 18)  SpO2: 96% (01 Oct 2023 07:38) (94% - 98%)    Parameters below as of 01 Oct 2023 07:38  Patient On (Oxygen Delivery Method): room air     Physical exam:    Constitutional:  No acute distress  HEENT: NC/AT, EOMI, PERRLA, conjunctivae clear; ears and nose atraumatic  Neck: supple; thyroid not palpable  Back: no tenderness  Respiratory: respiratory effort normal; clear to auscultation  Cardiovascular: S1S2 regular, no murmurs  Abdomen: soft, not tender, not distended, positive BS  Genitourinary: no suprapubic tenderness  Lymphatic: no LN palpable  Musculoskeletal: no muscle tenderness, no joint swelling or tenderness  Extremities: 1+ pedal edema  RLE (ankle, calf, shin) erythema, edema, warmth and tenderness  multiple skin breaks open  Neurological/ Psychiatric: AxOx3, moving all extremities  Skin: no rashes; no palpable lesions    Labs: reviewed                        14.0   6.50  )-----------( 165      ( 30 Sep 2023 07:28 )             43.2     09-30    138  |  105  |  24<H>  ----------------------------<  110<H>  3.7   |  29  |  0.97    Ca    9.2      30 Sep 2023 07:28    TPro  6.8  /  Alb  3.3  /  TBili  1.1  /  DBili  x   /  AST  12<L>  /  ALT  15  /  AlkPhos  84  09-30                        14.0   6.50  )-----------( 165      ( 30 Sep 2023 07:28 )             43.2     09-30    138  |  105  |  24<H>  ----------------------------<  110<H>  3.7   |  29  |  0.97    Ca    9.2      30 Sep 2023 07:28    TPro  6.8  /  Alb  3.3  /  TBili  1.1  /  DBili  x   /  AST  12<L>  /  ALT  15  /  AlkPhos  84  09-30     LIVER FUNCTIONS - ( 30 Sep 2023 07:28 )  Alb: 3.3 g/dL / Pro: 6.8 gm/dL / ALK PHOS: 84 U/L / ALT: 15 U/L / AST: 12 U/L / GGT: x           Urinalysis (09-29 @ 17:47)  Urine Appearance: Slightly Turbid  Protein, Urine: Negative  Urine Microscopic-Add On (NC) (09-29 @ 17:47)  White Blood Cell - Urine: Negative /HPF  Red Blood Cell - Urine: Negative /HPF    Culture - Blood (collected 29 Sep 2023 17:24)  Source: .Blood None  Preliminary Report (01 Oct 2023 01:01):    No growth at 24 hours    Culture - Blood (collected 29 Sep 2023 15:40)  Source: .Blood None  Preliminary Report (01 Oct 2023 01:01):    No growth at 24 hours    Radiology: all available radiological tests reviewed    < from: Xray Chest 1 View-PORTABLE IMMEDIATE (09.29.23 @ 16:22) >  IMPRESSION: Low lung volumes. Linear scarring versus atelectasis at the   left lung base. Otherwise, clear lungs.  < end of copied text >    < from: US Duplex Venous Lower Ext Ltd, Right (09.29.23 @ 16:12) >  No evidence of right lower extremity deep venous thrombosis.  < end of copied text >    Advanced directives addressed: full resuscitation
Date of service: 10-02-23 @ 11:30    OOB to chair   Has right lower leg swelling and redness  Scratch marks    ROS: no fever or chills; denies dizziness, no HA, no SOB or cough, no abdominal pain, no diarrhea or constipation; no dysuria, no legs pain    MEDICATIONS  (STANDING):  allopurinol 100 milliGRAM(s) Oral daily  atorvastatin 20 milliGRAM(s) Oral at bedtime  carvedilol 25 milliGRAM(s) Oral every 12 hours  cefepime  Injectable.      cefepime  Injectable. 1000 milliGRAM(s) IV Push every 8 hours  clopidogrel Tablet 75 milliGRAM(s) Oral daily  dextrose 5%. 1000 milliLiter(s) (50 mL/Hr) IV Continuous <Continuous>  dextrose 5%. 1000 milliLiter(s) (100 mL/Hr) IV Continuous <Continuous>  dextrose 50% Injectable 25 Gram(s) IV Push once  dextrose 50% Injectable 12.5 Gram(s) IV Push once  dextrose 50% Injectable 25 Gram(s) IV Push once  enoxaparin Injectable 40 milliGRAM(s) SubCutaneous every 12 hours  escitalopram 10 milliGRAM(s) Oral daily  famotidine    Tablet 40 milliGRAM(s) Oral at bedtime  gabapentin 300 milliGRAM(s) Oral daily  glucagon  Injectable 1 milliGRAM(s) IntraMuscular once  hydrochlorothiazide 25 milliGRAM(s) Oral daily  influenza  Vaccine (HIGH DOSE) 0.7 milliLiter(s) IntraMuscular once  insulin lispro (ADMELOG) corrective regimen sliding scale   SubCutaneous three times a day before meals  lactobacillus acidophilus 1 Tablet(s) Oral two times a day with meals  losartan 100 milliGRAM(s) Oral daily  vancomycin  IVPB 750 milliGRAM(s) IV Intermittent every 12 hours    Vital Signs Last 24 Hrs  T(C): 36.5 (02 Oct 2023 07:56), Max: 36.5 (02 Oct 2023 07:56)  T(F): 97.7 (02 Oct 2023 07:56), Max: 97.7 (02 Oct 2023 07:56)  HR: 68 (02 Oct 2023 07:56) (61 - 79)  BP: 108/70 (02 Oct 2023 07:56) (108/70 - 135/76)  BP(mean): 94 (02 Oct 2023 06:19) (94 - 94)  RR: 18 (02 Oct 2023 07:56) (18 - 18)  SpO2: 98% (02 Oct 2023 06:19) (93% - 98%)    Parameters below as of 02 Oct 2023 06:19  Patient On (Oxygen Delivery Method): room air     Physical exam:    Vital Signs Last 24 Hrs  T(C): 36.4 (01 Oct 2023 07:38), Max: 36.8 (30 Sep 2023 15:56)  T(F): 97.5 (01 Oct 2023 07:38), Max: 98.2 (30 Sep 2023 15:56)  HR: 68 (01 Oct 2023 07:38) (59 - 73)  BP: 120/81 (01 Oct 2023 07:38) (118/68 - 158/100)  BP(mean): --  RR: 18 (01 Oct 2023 07:38) (17 - 18)  SpO2: 96% (01 Oct 2023 07:38) (94% - 98%)    Parameters below as of 01 Oct 2023 07:38  Patient On (Oxygen Delivery Method): room air     Physical exam:    Constitutional:  No acute distress  HEENT: NC/AT, EOMI, PERRLA, conjunctivae clear; ears and nose atraumatic  Neck: supple; thyroid not palpable  Back: no tenderness  Respiratory: respiratory effort normal; clear to auscultation  Cardiovascular: S1S2 regular, no murmurs  Abdomen: soft, not tender, not distended, positive BS  Genitourinary: no suprapubic tenderness  Lymphatic: no LN palpable  Musculoskeletal: no muscle tenderness, no joint swelling or tenderness  Extremities: 1+ pedal edema  RLE (ankle, calf, shin) erythema, edema, warmth and tenderness - improving  multiple skin breaks open  Neurological/ Psychiatric: AxOx3, moving all extremities  Skin: no rashes; no palpable lesions    Labs: reviewed    Vancomycin Level, Trough: 12.6 ug/mL (10-01 @ 20:50)                        14.0   6.50  )-----------( 165      ( 30 Sep 2023 07:28 )             43.2     09-30    138  |  105  |  24<H>  ----------------------------<  110<H>  3.7   |  29  |  0.97    Ca    9.2      30 Sep 2023 07:28    TPro  6.8  /  Alb  3.3  /  TBili  1.1  /  DBili  x   /  AST  12<L>  /  ALT  15  /  AlkPhos  84  09-30                        14.0   6.50  )-----------( 165      ( 30 Sep 2023 07:28 )             43.2     09-30    138  |  105  |  24<H>  ----------------------------<  110<H>  3.7   |  29  |  0.97    Ca    9.2      30 Sep 2023 07:28    TPro  6.8  /  Alb  3.3  /  TBili  1.1  /  DBili  x   /  AST  12<L>  /  ALT  15  /  AlkPhos  84  09-30     LIVER FUNCTIONS - ( 30 Sep 2023 07:28 )  Alb: 3.3 g/dL / Pro: 6.8 gm/dL / ALK PHOS: 84 U/L / ALT: 15 U/L / AST: 12 U/L / GGT: x           Urinalysis (09-29 @ 17:47)  Urine Appearance: Slightly Turbid  Protein, Urine: Negative  Urine Microscopic-Add On (NC) (09-29 @ 17:47)  White Blood Cell - Urine: Negative /HPF  Red Blood Cell - Urine: Negative /HPF    Culture - Blood (collected 29 Sep 2023 17:24)  Source: .Blood None  Preliminary Report (01 Oct 2023 01:01):    No growth at 24 hours    Culture - Blood (collected 29 Sep 2023 15:40)  Source: .Blood None  Preliminary Report (01 Oct 2023 01:01):    No growth at 24 hours    Radiology: all available radiological tests reviewed    < from: Xray Chest 1 View-PORTABLE IMMEDIATE (09.29.23 @ 16:22) >  IMPRESSION: Low lung volumes. Linear scarring versus atelectasis at the   left lung base. Otherwise, clear lungs.  < end of copied text >    < from: US Duplex Venous Lower Ext Ltd, Right (09.29.23 @ 16:12) >  No evidence of right lower extremity deep venous thrombosis.  < end of copied text >    Advanced directives addressed: full resuscitation
Date of service: 10-04-23 @ 09:25    OOB to chair  Right lower leg feels itchy  Has lower leg erythema and edema  No discharge     ROS: no fever or chills; denies dizziness, no HA, no SOB or cough, no abdominal pain, no diarrhea or constipation; no dysuria    MEDICATIONS  (STANDING):  allopurinol 100 milliGRAM(s) Oral daily  atorvastatin 20 milliGRAM(s) Oral at bedtime  carvedilol 25 milliGRAM(s) Oral every 12 hours  cefepime  Injectable.      cefepime  Injectable. 1000 milliGRAM(s) IV Push every 8 hours  clopidogrel Tablet 75 milliGRAM(s) Oral daily  dextrose 5%. 1000 milliLiter(s) (50 mL/Hr) IV Continuous <Continuous>  dextrose 5%. 1000 milliLiter(s) (100 mL/Hr) IV Continuous <Continuous>  dextrose 50% Injectable 25 Gram(s) IV Push once  dextrose 50% Injectable 12.5 Gram(s) IV Push once  dextrose 50% Injectable 25 Gram(s) IV Push once  enoxaparin Injectable 40 milliGRAM(s) SubCutaneous every 12 hours  escitalopram 10 milliGRAM(s) Oral daily  famotidine    Tablet 40 milliGRAM(s) Oral at bedtime  gabapentin 300 milliGRAM(s) Oral daily  glucagon  Injectable 1 milliGRAM(s) IntraMuscular once  hydrochlorothiazide 25 milliGRAM(s) Oral daily  influenza  Vaccine (HIGH DOSE) 0.7 milliLiter(s) IntraMuscular once  insulin lispro (ADMELOG) corrective regimen sliding scale   SubCutaneous three times a day before meals  lactobacillus acidophilus 1 Tablet(s) Oral two times a day with meals  losartan 100 milliGRAM(s) Oral daily  vancomycin  IVPB 750 milliGRAM(s) IV Intermittent every 12 hours    Vital Signs Last 24 Hrs  T(C): 36.6 (04 Oct 2023 08:40), Max: 36.9 (03 Oct 2023 23:48)  T(F): 97.9 (04 Oct 2023 08:40), Max: 98.4 (03 Oct 2023 23:48)  HR: 61 (04 Oct 2023 08:40) (61 - 71)  BP: 134/75 (04 Oct 2023 08:40) (103/66 - 134/75)  BP(mean): --  RR: 18 (04 Oct 2023 08:40) (18 - 18)  SpO2: 97% (04 Oct 2023 08:40) (95% - 97%)    Parameters below as of 04 Oct 2023 08:40  Patient On (Oxygen Delivery Method): room air     Physical exam:    Constitutional:  No acute distress  HEENT: NC/AT, EOMI, PERRLA, conjunctivae clear; ears and nose atraumatic  Neck: supple; thyroid not palpable  Back: no tenderness  Respiratory: respiratory effort normal; clear to auscultation  Cardiovascular: S1S2 regular, no murmurs  Abdomen: soft, not tender, not distended, positive BS  Genitourinary: no suprapubic tenderness  Lymphatic: no LN palpable  Musculoskeletal: no muscle tenderness, no joint swelling or tenderness  Extremities: 1+ pedal edema  RLE (ankle, calf, shin) erythema, edema, warmth and tenderness - improving slowly  multiple skin breaks open  Neurological/ Psychiatric: AxOx3, moving all extremities  Skin: no rashes; no palpable lesions    Labs: reviewed                        14.1   7.42  )-----------( 172      ( 04 Oct 2023 08:14 )             43.9     10-04    138  |  105  |  25<H>  ----------------------------<  116<H>  3.9   |  29  |  1.00    Ca    9.2      04 Oct 2023 08:14  Mg     2.1     10-04    Vancomycin Level, Trough: 11.0 ug/mL (10-03 @ 10:31)  Vancomycin Level, Trough: 12.6 ug/mL (10-01 @ 20:50)                        14.0   6.50  )-----------( 165      ( 30 Sep 2023 07:28 )             43.2     09-30    138  |  105  |  24<H>  ----------------------------<  110<H>  3.7   |  29  |  0.97    Ca    9.2      30 Sep 2023 07:28    TPro  6.8  /  Alb  3.3  /  TBili  1.1  /  DBili  x   /  AST  12<L>  /  ALT  15  /  AlkPhos  84  09-30                        14.0   6.50  )-----------( 165      ( 30 Sep 2023 07:28 )             43.2     09-30    138  |  105  |  24<H>  ----------------------------<  110<H>  3.7   |  29  |  0.97    Ca    9.2      30 Sep 2023 07:28    TPro  6.8  /  Alb  3.3  /  TBili  1.1  /  DBili  x   /  AST  12<L>  /  ALT  15  /  AlkPhos  84  09-30     LIVER FUNCTIONS - ( 30 Sep 2023 07:28 )  Alb: 3.3 g/dL / Pro: 6.8 gm/dL / ALK PHOS: 84 U/L / ALT: 15 U/L / AST: 12 U/L / GGT: x           Urinalysis (09-29 @ 17:47)  Urine Appearance: Slightly Turbid  Protein, Urine: Negative  Urine Microscopic-Add On (NC) (09-29 @ 17:47)  White Blood Cell - Urine: Negative /HPF  Red Blood Cell - Urine: Negative /HPF    Culture - Blood (collected 29 Sep 2023 17:24)  Source: .Blood None  Preliminary Report (01 Oct 2023 01:01):    No growth at 24 hours    Culture - Blood (collected 29 Sep 2023 15:40)  Source: .Blood None  Preliminary Report (01 Oct 2023 01:01):    No growth at 24 hours    Radiology: all available radiological tests reviewed    < from: Xray Chest 1 View-PORTABLE IMMEDIATE (09.29.23 @ 16:22) >  IMPRESSION: Low lung volumes. Linear scarring versus atelectasis at the   left lung base. Otherwise, clear lungs.  < end of copied text >    < from: US Duplex Venous Lower Ext Ltd, Right (09.29.23 @ 16:12) >  No evidence of right lower extremity deep venous thrombosis.  < end of copied text >    Advanced directives addressed: full resuscitation
  HPI:  Patient is a 73-year-old male with a past medical history of hypertension, diabetes type 2 non-insulin-dependent, hyperlipidemia, stroke, STEMI, gout, AKOSUA, who presents from the wound care clinic for IV antibiotics after failure with outpatient treatment on Keflex and wason third day of doxycycline on day of admission.  Patient states that he has been seeing Dr. Sullivan and wound care, states wound originally improved however has been worsening over past week with enlargement of redness and swelling.       Review of Systems:  CONSTITUTIONAL: No weakness, fevers or chills  EYES/ENT: No visual changes;  No vertigo or throat pain   NECK: No pain or stiffness  RESPIRATORY: No cough, wheezing, hemoptysis; No shortness of breath,   CARDIOVASCULAR: No chest pain or palpitations  GASTROINTESTINAL: No abdominal or epigastric pain. No nausea, vomiting, or hematemesis; No diarrhea or constipation.   GENITOURINARY: No dysuria, frequency or hematuria  NEUROLOGICAL: No numbness or weakness  SKIN: lesions -redness on leg  All other review of systems is negative unless indicated above    PHYSICAL EXAM:    Vital Signs Last 24 Hrs  T(C): 36.9 (03 Oct 2023 23:48), Max: 36.9 (03 Oct 2023 23:48)  T(F): 98.4 (03 Oct 2023 23:48), Max: 98.4 (03 Oct 2023 23:48)  HR: 71 (03 Oct 2023 23:48) (63 - 71)  BP: 124/61 (03 Oct 2023 23:48) (103/66 - 124/61)  BP(mean): --  RR: 18 (03 Oct 2023 15:33) (18 - 18)  SpO2: 95% (03 Oct 2023 23:48) (95% - 97%)    Parameters below as of 03 Oct 2023 15:33  Patient On (Oxygen Delivery Method): room air        GENERAL: comfortable   HEAD:  Atraumatic, Normocephalic  EYES: EOMI, PERRLA, conjunctiva and sclera clear  HEENT: Moist mucous membranes  NECK: Supple, No JVD  NERVOUS SYSTEM:  Alert & Oriented X3, Motor Strength 5/5 B/L upper and lower extremities; DTRs 2+ intact and symmetric  CHEST/LUNG: Clear to auscultation bilaterally; No rales, rhonchi, wheezing, or rubs  HEART:S1S2 normal, no murmer  ABDOMEN: Soft, Nontender, Nondistended; Bowel sounds present  GENITOURINARY- Voiding, no palpable bladder  EXTREMITIES:  edema and redness present on leg   MUSCULOSKELTAL- No muscle tenderness, Muscle tone normal, No joint tenderness, no Joint swelling, Joint range of motion-normal  SKIN-redness   PSYCH- Mood stable  LYMPH Node- No palpable lymph node    LABS:                CAPILLARY BLOOD GLUCOSE      POCT Blood Glucose.: 114 mg/dL (03 Oct 2023 22:41)  POCT Blood Glucose.: 116 mg/dL (03 Oct 2023 17:30)  POCT Blood Glucose.: 117 mg/dL (03 Oct 2023 11:32)  POCT Blood Glucose.: 123 mg/dL (03 Oct 2023 08:36)            Standing medicine  acetaminophen     Tablet .. 650 milliGRAM(s) Oral every 6 hours PRN  allopurinol 100 milliGRAM(s) Oral daily  atorvastatin 20 milliGRAM(s) Oral at bedtime  carvedilol 25 milliGRAM(s) Oral every 12 hours  cefepime  Injectable.      cefepime  Injectable. 1000 milliGRAM(s) IV Push every 8 hours  clopidogrel Tablet 75 milliGRAM(s) Oral daily  dextrose 5%. 1000 milliLiter(s) IV Continuous <Continuous>  dextrose 5%. 1000 milliLiter(s) IV Continuous <Continuous>  dextrose 50% Injectable 25 Gram(s) IV Push once  dextrose 50% Injectable 12.5 Gram(s) IV Push once  dextrose 50% Injectable 25 Gram(s) IV Push once  dextrose Oral Gel 15 Gram(s) Oral once PRN  enoxaparin Injectable 40 milliGRAM(s) SubCutaneous every 12 hours  escitalopram 10 milliGRAM(s) Oral daily  famotidine    Tablet 40 milliGRAM(s) Oral at bedtime  gabapentin 300 milliGRAM(s) Oral daily  glucagon  Injectable 1 milliGRAM(s) IntraMuscular once  hydrochlorothiazide 25 milliGRAM(s) Oral daily  influenza  Vaccine (HIGH DOSE) 0.7 milliLiter(s) IntraMuscular once  insulin lispro (ADMELOG) corrective regimen sliding scale   SubCutaneous three times a day before meals  lactobacillus acidophilus 1 Tablet(s) Oral two times a day with meals  losartan 100 milliGRAM(s) Oral daily  oxyCODONE    IR 2.5 milliGRAM(s) Oral every 4 hours PRN  vancomycin  IVPB 750 milliGRAM(s) IV Intermittent every 12 hours        
Patient is a 73-year-old male with a past medical history of hypertension, diabetes type 2 non-insulin-dependent, hyperlipidemia, stroke, STEMI, gout, AKOSUA, who presents from the wound care clinic for IV antibiotics after failure with outpatient treatment on Keflex and was on third day of doxycycline.  Patient states that he has been seeing Dr. Sullivan and wound care, states wound originally improved however has been worsening over past week with enlargement of redness and swelling.     10.1: no cp no sob, c/o significant R leg erythema and edema, pain   10/2 - no cp palps sob abdo pain, is ambulating , leg is getting  better, scratching his leg but doesnt want to try atarax etc     Vital Signs Last 24 Hrs  T(C): 36.6 (02 Oct 2023 15:47), Max: 36.6 (02 Oct 2023 15:47)  HR: 69 (02 Oct 2023 15:47) (61 - 79)  BP: 99/67 (02 Oct 2023 15:47) (99/67 - 135/76)  RR: 18 (02 Oct 2023 15:47) (18 - 18)  SpO2: 95% (02 Oct 2023 15:47) (93% - 98%)/RA   GENERAL: NAD  HEENT:  NC/AT, EOMI, PERRLA, No scleral icterus, Moist mucous membranes  NECK: Supple, No JVD  CNS:  Alert & Oriented X3, Motor Strength 5/5 B/L upper and lower extremities; DTRs 2+ intact   LUNG: Normal Breath sounds, Clear to auscultation bilaterally, No rales, No rhonchi, No wheezing  HEART: RRR; No murmurs, No rubs  ABDOMEN: +BS, ST/ND/NT  GENITOURINARY: Voiding, Bladder not distended  EXTREMITIES:  2+ Peripheral Pulses, No clubbing, No cyanosis, No tibial edema  MUSCULOSKELTAL: Joints normal ROM, No TTP, No effusion  SKIN: extensive erythema and edema of RLE     LABS: All Labs Reviewed - PRN only   RADIOLOGY/EKG:  < from: Xray Tibia + Fibula 2 Views, Right (09.29.23 @ 16:23) >  Intact bones andalignment. No evidence of fracture or suspicious lesion.  Soft tissue thickening and edema is apparent    < from: US Duplex Venous Lower Ext Ltd, Right (09.29.23 @ 16:12) >  No evidence of right lower extremity deep venous thrombosis.    MEDS:   acetaminophen     Tablet .. 650 milliGRAM(s) Oral every 6 hours PRN  allopurinol 100 milliGRAM(s) Oral daily  atorvastatin 20 milliGRAM(s) Oral at bedtime  carvedilol 25 milliGRAM(s) Oral every 12 hours  cefepime  Injectable.      cefepime  Injectable. 1000 milliGRAM(s) IV Push every 8 hours  clopidogrel Tablet 75 milliGRAM(s) Oral daily  enoxaparin Injectable 40 milliGRAM(s) SubCutaneous every 12 hours  escitalopram 10 milliGRAM(s) Oral daily  famotidine    Tablet 40 milliGRAM(s) Oral at bedtime  gabapentin 300 milliGRAM(s) Oral daily  glucagon  Injectable 1 milliGRAM(s) IntraMuscular once  hydrochlorothiazide 25 milliGRAM(s) Oral daily  influenza  Vaccine (HIGH DOSE) 0.7 milliLiter(s) IntraMuscular once  insulin lispro (ADMELOG) corrective regimen sliding scale   SubCutaneous three times a day before meals  lactobacillus acidophilus 1 Tablet(s) Oral two times a day with meals  losartan 100 milliGRAM(s) Oral daily  oxyCODONE    IR 2.5 milliGRAM(s) Oral every 4 hours PRN  vancomycin  IVPB 750 milliGRAM(s) IV Intermittent every 12 hours              
Patient is a 73-year-old male with a past medical history of hypertension, diabetes type 2 non-insulin-dependent, hyperlipidemia, stroke, STEMI, gout, AKOSUA, who presents from the wound care clinic for IV antibiotics after failure with outpatient treatment on Keflex and was on third day of doxycycline.  Patient states that he has been seeing Dr. Sullivan and wound care, states wound originally improved however has been worsening over past week with enlargement of redness and swelling.     10.1: no cp no sob, c/o significant R leg erythema and edema, pain   10/2 - no cp palps sob abdo pain, is ambulating , leg is getting  better, scratching his leg but doesnt want to try atarax etc   10/3 - no cp palps sob, having BMs, ambulating in hallway     Vital Signs Last 24 Hrs  T(F): 97.5 (03 Oct 2023 15:33), Max: 97.7 (03 Oct 2023 07:50)  HR: 70 (03 Oct 2023 15:33) (63 - 70)  BP: 103/66 (03 Oct 2023 15:33) (103/66 - 129/74)  RR: 18 (03 Oct 2023 15:33) (16 - 18)  SpO2: 95% (03 Oct 2023 15:33) (94% - 97%)  GENERAL: NAD  HEENT:  NC/AT, EOMI, PERRLA, No scleral icterus, Moist mucous membranes  NECK: Supple, No JVD  CNS:  Alert & Oriented X3, Motor Strength 5/5 B/L upper and lower extremities; DTRs 2+ intact   LUNG: Normal Breath sounds, Clear to auscultation bilaterally, No rales, No rhonchi, No wheezing  HEART: RRR; No murmurs, No rubs  ABDOMEN: +BS, ST/ND/NT  GENITOURINARY: Voiding, Bladder not distended  EXTREMITIES:  2+ Peripheral Pulses, No clubbing, No cyanosis, No tibial edema  MUSCULOSKELTAL: Joints normal ROM, No TTP, No effusion  SKIN: extensive erythema and edema of RLE     LABS: All Labs Reviewed - PRN only   RADIOLOGY/EKG:  < from: Xray Tibia + Fibula 2 Views, Right (09.29.23 @ 16:23) >  Intact bones andalignment. No evidence of fracture or suspicious lesion.  Soft tissue thickening and edema is apparent    < from: US Duplex Venous Lower Ext Ltd, Right (09.29.23 @ 16:12) >  No evidence of right lower extremity deep venous thrombosis.    LABS: All Labs Reviewed:  PRN only    MEDS   acetaminophen     Tablet .. 650 milliGRAM(s) Oral every 6 hours PRN  allopurinol 100 milliGRAM(s) Oral daily  atorvastatin 20 milliGRAM(s) Oral at bedtime  carvedilol 25 milliGRAM(s) Oral every 12 hours  cefepime  Injectable.      cefepime  Injectable. 1000 milliGRAM(s) IV Push every 8 hours  clopidogrel Tablet 75 milliGRAM(s) Oral daily  enoxaparin Injectable 40 milliGRAM(s) SubCutaneous every 12 hours  escitalopram 10 milliGRAM(s) Oral daily  famotidine    Tablet 40 milliGRAM(s) Oral at bedtime  gabapentin 300 milliGRAM(s) Oral daily  glucagon  Injectable 1 milliGRAM(s) IntraMuscular once  hydrochlorothiazide 25 milliGRAM(s) Oral daily  influenza  Vaccine (HIGH DOSE) 0.7 milliLiter(s) IntraMuscular once  insulin lispro (ADMELOG) corrective regimen sliding scale   SubCutaneous three times a day before meals  lactobacillus acidophilus 1 Tablet(s) Oral two times a day with meals  losartan 100 milliGRAM(s) Oral daily  oxyCODONE    IR 2.5 milliGRAM(s) Oral every 4 hours PRN  vancomycin  IVPB 750 milliGRAM(s) IV Intermittent every 12 hours                      
Date of Consult: 10/4/23  HPI: Patient is a 73-year-old male with a past medical history of hypertension, diabetes type 2 non-insulin-dependent, hyperlipidemia, stroke, STEMI, gout, AKOSUA, who presents from the wound care clinic for IV antibiotics after failure with outpatient treatment on Keflex and is currently on third day of doxycycline.  Patient states that he has been seeing Dr. Sullivan and wound care, states wound originally improved however has been worsening over past week with enlargement of redness and swelling.     10/4: pt seen by podiatry team today AM. pt walking in the room and hallway. NAD.     PMH: Gout    Personal History of CVA (Cerebrovascular Accident)    HTN - Hypertension    AKOSUA (Obstructive Sleep Apnea)    Depression      PSH:History of Tonsillectomy    Appendicitis, Acute, with Peritonitis        Allergies:Motrin (Chills)      Labs:        WBC Trend  6.50 Date (09-30 @ 07:28)  9.31 Date (09-29 @ 15:40)      Chem          Vital Signs Last 24 Hrs  T(C): 36.6 (04 Oct 2023 08:40), Max: 36.9 (03 Oct 2023 23:48)  T(F): 97.9 (04 Oct 2023 08:40), Max: 98.4 (03 Oct 2023 23:48)  HR: 61 (04 Oct 2023 08:40) (61 - 71)  BP: 134/75 (04 Oct 2023 08:40) (103/66 - 134/75)  BP(mean): --  RR: 18 (04 Oct 2023 08:40) (18 - 18)  SpO2: 97% (04 Oct 2023 08:40) (95% - 97%)    Parameters below as of 04 Oct 2023 08:40  Patient On (Oxygen Delivery Method): room air        REVIEW OF SYSTEMS:    CONSTITUTIONAL: No weakness, fevers or chills  EYES: No visual changes  RESPIRATORY: No cough, wheezing; No shortness of breath  CARDIOVASCULAR: No chest pain or palpitations  GASTROINTESTINAL: No abdominal or epigastric pain. No nausea, vomiting; No diarrhea or constipation.   GENITOURINARY: No dysuria, frequency or hematuria  NEUROLOGICAL: No numbness or weakness  SKIN: See physical examination.  All other review of systems is negative unless indicated above    Physical Exam:   Constitutional: NAD, alert;  Lower Extremity Focus  Derm:  Skin warm, dry and supple bilateral.    Noted erythema from above Right ankle extending to approx below tibial tuberosity. Noted scabs and skin breakage to anterior Right lower leg, superficial in nature, no purulence, no fluctuance, no tracking/tunneling, no probe to bone.   Vascular: Dorsalis Pedis weakly palpable 1/4 and Posterior Tibial pulses non palpable  Neuro: Protective sensation diminished to the level of the digits bilateral.  MSK: Muscle strength 5/5 all major muscle groups bilateral.        < from: Xray Tibia + Fibula 2 Views, Right (09.29.23 @ 16:23) >  INTERPRETATION:  Right tib-fib AP and lateral projections    CLINICAL HISTORY: Sepsis/pain      IMPRESSION:  Intact bones andalignment. No evidence of fracture or   suspicious lesion.    Soft tissue thickening and edema is apparent    --- End of Report ---            ANTHONY HUMPHREY MD; Attending Radiologist  This document has been electronically signed. Sep 30 2023  9:57AM    < end of copied text >    
Date of service: 10-03-23 @ 10:18    Lying in bed in NAD  Has right lower leg erythema and david=ma  New scratch marks noted  No fever    ROS: no fever or chills; denies dizziness, no HA, no SOB or cough, no abdominal pain, no diarrhea or constipation; no dysuria, no legs pain, no rashes    MEDICATIONS  (STANDING):  allopurinol 100 milliGRAM(s) Oral daily  atorvastatin 20 milliGRAM(s) Oral at bedtime  carvedilol 25 milliGRAM(s) Oral every 12 hours  cefepime  Injectable.      cefepime  Injectable. 1000 milliGRAM(s) IV Push every 8 hours  clopidogrel Tablet 75 milliGRAM(s) Oral daily  dextrose 5%. 1000 milliLiter(s) (50 mL/Hr) IV Continuous <Continuous>  dextrose 5%. 1000 milliLiter(s) (100 mL/Hr) IV Continuous <Continuous>  dextrose 50% Injectable 25 Gram(s) IV Push once  dextrose 50% Injectable 12.5 Gram(s) IV Push once  dextrose 50% Injectable 25 Gram(s) IV Push once  enoxaparin Injectable 40 milliGRAM(s) SubCutaneous every 12 hours  escitalopram 10 milliGRAM(s) Oral daily  famotidine    Tablet 40 milliGRAM(s) Oral at bedtime  gabapentin 300 milliGRAM(s) Oral daily  glucagon  Injectable 1 milliGRAM(s) IntraMuscular once  hydrochlorothiazide 25 milliGRAM(s) Oral daily  influenza  Vaccine (HIGH DOSE) 0.7 milliLiter(s) IntraMuscular once  insulin lispro (ADMELOG) corrective regimen sliding scale   SubCutaneous three times a day before meals  lactobacillus acidophilus 1 Tablet(s) Oral two times a day with meals  losartan 100 milliGRAM(s) Oral daily  vancomycin  IVPB 750 milliGRAM(s) IV Intermittent every 12 hours    Vital Signs Last 24 Hrs  T(C): 36.5 (03 Oct 2023 07:50), Max: 36.6 (02 Oct 2023 15:47)  T(F): 97.7 (03 Oct 2023 07:50), Max: 97.9 (02 Oct 2023 15:47)  HR: 63 (03 Oct 2023 07:50) (63 - 78)  BP: 119/61 (03 Oct 2023 07:50) (99/67 - 129/74)  BP(mean): --  RR: 18 (03 Oct 2023 07:50) (16 - 18)  SpO2: 97% (03 Oct 2023 07:50) (94% - 97%)    Parameters below as of 03 Oct 2023 07:50  Patient On (Oxygen Delivery Method): room air     Physical exam:    Constitutional:  No acute distress  HEENT: NC/AT, EOMI, PERRLA, conjunctivae clear; ears and nose atraumatic  Neck: supple; thyroid not palpable  Back: no tenderness  Respiratory: respiratory effort normal; clear to auscultation  Cardiovascular: S1S2 regular, no murmurs  Abdomen: soft, not tender, not distended, positive BS  Genitourinary: no suprapubic tenderness  Lymphatic: no LN palpable  Musculoskeletal: no muscle tenderness, no joint swelling or tenderness  Extremities: 1+ pedal edema  RLE (ankle, calf, shin) erythema, edema, warmth and tenderness - improving  multiple skin breaks open  Neurological/ Psychiatric: AxOx3, moving all extremities  Skin: no rashes; no palpable lesions    Labs: reviewed    Vancomycin Level, Trough: 12.6 ug/mL (10-01 @ 20:50)                        14.0   6.50  )-----------( 165      ( 30 Sep 2023 07:28 )             43.2     09-30    138  |  105  |  24<H>  ----------------------------<  110<H>  3.7   |  29  |  0.97    Ca    9.2      30 Sep 2023 07:28    TPro  6.8  /  Alb  3.3  /  TBili  1.1  /  DBili  x   /  AST  12<L>  /  ALT  15  /  AlkPhos  84  09-30                        14.0   6.50  )-----------( 165      ( 30 Sep 2023 07:28 )             43.2     09-30    138  |  105  |  24<H>  ----------------------------<  110<H>  3.7   |  29  |  0.97    Ca    9.2      30 Sep 2023 07:28    TPro  6.8  /  Alb  3.3  /  TBili  1.1  /  DBili  x   /  AST  12<L>  /  ALT  15  /  AlkPhos  84  09-30     LIVER FUNCTIONS - ( 30 Sep 2023 07:28 )  Alb: 3.3 g/dL / Pro: 6.8 gm/dL / ALK PHOS: 84 U/L / ALT: 15 U/L / AST: 12 U/L / GGT: x           Urinalysis (09-29 @ 17:47)  Urine Appearance: Slightly Turbid  Protein, Urine: Negative  Urine Microscopic-Add On (NC) (09-29 @ 17:47)  White Blood Cell - Urine: Negative /HPF  Red Blood Cell - Urine: Negative /HPF    Culture - Blood (collected 29 Sep 2023 17:24)  Source: .Blood None  Preliminary Report (01 Oct 2023 01:01):    No growth at 24 hours    Culture - Blood (collected 29 Sep 2023 15:40)  Source: .Blood None  Preliminary Report (01 Oct 2023 01:01):    No growth at 24 hours    Radiology: all available radiological tests reviewed    < from: Xray Chest 1 View-PORTABLE IMMEDIATE (09.29.23 @ 16:22) >  IMPRESSION: Low lung volumes. Linear scarring versus atelectasis at the   left lung base. Otherwise, clear lungs.  < end of copied text >    < from: US Duplex Venous Lower Ext Ltd, Right (09.29.23 @ 16:12) >  No evidence of right lower extremity deep venous thrombosis.  < end of copied text >    Advanced directives addressed: full resuscitation
  Patient is a 73-year-old male with a past medical history of hypertension, diabetes type 2 non-insulin-dependent, hyperlipidemia, stroke, STEMI, gout, AKOSUA, who presents from the wound care clinic for IV antibiotics after failure with outpatient treatment on Keflex and was on third day of doxycycline.  Patient states that he has been seeing Dr. Sullivan and wound care, states wound originally improved however has been worsening over past week with enlargement of redness and swelling.         Review of Systems:  CONSTITUTIONAL: No weakness, fevers or chills  EYES/ENT: No visual changes;  No vertigo or throat pain   NECK: No pain or stiffness  RESPIRATORY: No cough, wheezing, hemoptysis; No shortness of breath,   CARDIOVASCULAR: No chest pain or palpitations  GASTROINTESTINAL: No abdominal or epigastric pain. No nausea, vomiting, or hematemesis; No diarrhea or constipation.   GENITOURINARY: No dysuria, frequency or hematuria  NEUROLOGICAL: No numbness or weakness  SKIN: as mentioned in HPI  All other review of systems is negative unless indicated above    PHYSICAL EXAM:    Vital Signs Last 24 Hrs  T(C): 36.4 (30 Sep 2023 08:23), Max: 36.9 (29 Sep 2023 18:20)  T(F): 97.5 (30 Sep 2023 08:23), Max: 98.5 (29 Sep 2023 18:20)  HR: 55 (30 Sep 2023 08:23) (54 - 59)  BP: 143/78 (30 Sep 2023 08:23) (126/74 - 154/73)  BP(mean): 92 (29 Sep 2023 18:20) (92 - 103)  RR: 18 (30 Sep 2023 08:23) (16 - 19)  SpO2: 97% (30 Sep 2023 08:23) (95% - 100%)    Parameters below as of 30 Sep 2023 08:23  Patient On (Oxygen Delivery Method): room air        GENERAL: comfortable   HEAD:  Atraumatic, Normocephalic  EYES: EOMI, PERRLA, conjunctiva and sclera clear  HEENT: Moist mucous membranes  NECK: Supple, No JVD  NERVOUS SYSTEM:  Alert & Oriented X3, Motor Strength 5/5 B/L upper and lower extremities; DTRs 2+ intact and symmetric  CHEST/LUNG: Clear to auscultation bilaterally; No rales, rhonchi, wheezing, or rubs  HEART:S1S2 normal, no murmer  ABDOMEN: Soft, Nontender, Nondistended; Bowel sounds present  GENITOURINARY- Voiding, no palpable bladder  EXTREMITIES:  2+ Peripheral Pulses  MUSCULOSKELTAL- No muscle tenderness, Muscle tone normal, No joint tenderness, no Joint swelling, Joint range of motion-normal  SKIN-diffuse redness located on extremity -right leg   PSYCH- Mood stable  LYMPH Node- No palpable lymph node    LABS:                        14.0   6.50  )-----------( 165      ( 30 Sep 2023 07:28 )             43.2     09-30    138  |  105  |  24<H>  ----------------------------<  110<H>  3.7   |  29  |  0.97    Ca    9.2      30 Sep 2023 07:28    TPro  6.8  /  Alb  3.3  /  TBili  1.1  /  DBili  x   /  AST  12<L>  /  ALT  15  /  AlkPhos  84  09-30    PT/INR - ( 30 Sep 2023 07:28 )   PT: 12.5 sec;   INR: 1.11 ratio         PTT - ( 30 Sep 2023 07:28 )  PTT:33.2 sec  Urinalysis Basic - ( 30 Sep 2023 07:28 )    Color: x / Appearance: x / SG: x / pH: x  Gluc: 110 mg/dL / Ketone: x  / Bili: x / Urobili: x   Blood: x / Protein: x / Nitrite: x   Leuk Esterase: x / RBC: x / WBC x   Sq Epi: x / Non Sq Epi: x / Bacteria: x        CAPILLARY BLOOD GLUCOSE      POCT Blood Glucose.: 109 mg/dL (30 Sep 2023 08:13)  POCT Blood Glucose.: 88 mg/dL (29 Sep 2023 23:10)  POCT Blood Glucose.: 93 mg/dL (29 Sep 2023 13:04)            Standing medicine  acetaminophen     Tablet .. 650 milliGRAM(s) Oral every 6 hours PRN  allopurinol 100 milliGRAM(s) Oral daily  atorvastatin 20 milliGRAM(s) Oral at bedtime  carvedilol 25 milliGRAM(s) Oral every 12 hours  cefepime  Injectable.      cefepime  Injectable. 1000 milliGRAM(s) IV Push every 8 hours  clopidogrel Tablet 75 milliGRAM(s) Oral daily  dextrose 5%. 1000 milliLiter(s) IV Continuous <Continuous>  dextrose 5%. 1000 milliLiter(s) IV Continuous <Continuous>  dextrose 50% Injectable 25 Gram(s) IV Push once  dextrose 50% Injectable 25 Gram(s) IV Push once  dextrose 50% Injectable 12.5 Gram(s) IV Push once  dextrose Oral Gel 15 Gram(s) Oral once PRN  enoxaparin Injectable 40 milliGRAM(s) SubCutaneous every 12 hours  escitalopram 10 milliGRAM(s) Oral daily  famotidine    Tablet 40 milliGRAM(s) Oral at bedtime  gabapentin 300 milliGRAM(s) Oral daily  glucagon  Injectable 1 milliGRAM(s) IntraMuscular once  hydrochlorothiazide 25 milliGRAM(s) Oral daily  influenza  Vaccine (HIGH DOSE) 0.7 milliLiter(s) IntraMuscular once  insulin lispro (ADMELOG) corrective regimen sliding scale   SubCutaneous three times a day before meals  lactobacillus acidophilus 1 Tablet(s) Oral two times a day with meals  losartan 100 milliGRAM(s) Oral daily  oxyCODONE    IR 2.5 milliGRAM(s) Oral every 4 hours PRN  vancomycin  IVPB 750 milliGRAM(s) IV Intermittent every 12 hours        
Date of Consult: 10/3/23  HPI: Patient is a 73-year-old male with a past medical history of hypertension, diabetes type 2 non-insulin-dependent, hyperlipidemia, stroke, STEMI, gout, AKOSUA, who presents from the wound care clinic for IV antibiotics after failure with outpatient treatment on Keflex and is currently on third day of doxycycline.  Patient states that he has been seeing Dr. Sullivan and wound care, states wound originally improved however has been worsening over past week with enlargement of redness and swelling.     10/3: pt seen by podiatry team today AM. pt resting bedside. NAD.     PMH: Gout    Personal History of CVA (Cerebrovascular Accident)    HTN - Hypertension    AKOSUA (Obstructive Sleep Apnea)    Depression      PSH:History of Tonsillectomy    Appendicitis, Acute, with Peritonitis        Allergies:Motrin (Chills)      Labs:        WBC Trend  6.50 Date (09-30 @ 07:28)  9.31 Date (09-29 @ 15:40)      Chem          Vital Signs Last 24 Hrs  T(C): 36.5 (03 Oct 2023 07:50), Max: 36.6 (02 Oct 2023 15:47)  T(F): 97.7 (03 Oct 2023 07:50), Max: 97.9 (02 Oct 2023 15:47)  HR: 63 (03 Oct 2023 07:50) (63 - 78)  BP: 119/61 (03 Oct 2023 07:50) (99/67 - 129/74)  BP(mean): --  RR: 18 (03 Oct 2023 07:50) (16 - 18)  SpO2: 97% (03 Oct 2023 07:50) (94% - 97%)    Parameters below as of 03 Oct 2023 07:50  Patient On (Oxygen Delivery Method): room air      REVIEW OF SYSTEMS:    CONSTITUTIONAL: No weakness, fevers or chills  EYES: No visual changes  RESPIRATORY: No cough, wheezing; No shortness of breath  CARDIOVASCULAR: No chest pain or palpitations  GASTROINTESTINAL: No abdominal or epigastric pain. No nausea, vomiting; No diarrhea or constipation.   GENITOURINARY: No dysuria, frequency or hematuria  NEUROLOGICAL: No numbness or weakness  SKIN: See physical examination.  All other review of systems is negative unless indicated above    Physical Exam:   Constitutional: NAD, alert;  Lower Extremity Focus  Derm:  Skin warm, dry and supple bilateral.    Noted erythema from above Right ankle extending to approx below tibial tuberosity. Noted scabs and skin breakage to anterior Right lower leg, superficial in nature, no purulence, no fluctuance, no tracking/tunneling, no probe to bone.   Vascular: Dorsalis Pedis weakly palpable 1/4 and Posterior Tibial pulses non palpable  Neuro: Protective sensation diminished to the level of the digits bilateral.  MSK: Muscle strength 5/5 all major muscle groups bilateral.

## 2023-10-04 NOTE — DISCHARGE NOTE PROVIDER - CARE PROVIDER_API CALL
Anjum Montes  Internal Medicine  [address] [place] [address]  Phone: [phone]  Fax: [phone]  Follow Up Time: 1 week    Benedicto Sullivan  Foot and Ankle Surgery  [address], [address]  [place] [address]  Phone: [phone]  Fax: [phone]  Follow Up Time: 1 week

## 2023-10-04 NOTE — DISCHARGE NOTE PROVIDER - NSDCCPCAREPLAN_GEN_ALL_CORE_FT
PRINCIPAL DISCHARGE DIAGNOSIS  Diagnosis: Cellulitis of right leg  Assessment and Plan of Treatment:

## 2023-10-04 NOTE — DISCHARGE NOTE NURSING/CASE MANAGEMENT/SOCIAL WORK - NSDCPEFALRISK_GEN_ALL_CORE
For information on Fall & Injury Prevention, visit: [email or web address] OR  [email or web address] OR  https://www.cdc.gov/steadi/patient.html

## 2023-10-04 NOTE — DISCHARGE NOTE PROVIDER - NSDCMRMEDTOKEN_GEN_ALL_CORE_FT
allopurinol 100 mg oral tablet: 1 tab(s) orally once a day  atorvastatin 20 mg oral tablet: 1 tab(s) orally  carvedilol 25 mg oral tablet: 1 tab(s) orally 2 times a day  clopidogrel 75 mg oral tablet: 1 tab(s) orally once a day  doxycycline hyclate 100 mg oral tablet: 1 tab(s) orally 2 times a day  escitalopram 10 mg oral tablet: 1 tab(s) orally once a day  famotidine 40 mg oral tablet: 1 tab(s) orally once a day (at bedtime)  gabapentin 300 mg oral tablet: 1 tab(s) orally once a day  hydroCHLOROthiazide 25 mg oral tablet: 1 tab(s) orally once a day  losartan 100 mg oral tablet: 1 tab(s) orally once a day  metFORMIN 500 mg oral tablet, extended release: 1 tab(s) orally 2 times a day

## 2023-10-04 NOTE — DISCHARGE NOTE NURSING/CASE MANAGEMENT/SOCIAL WORK - PATIENT PORTAL LINK FT
You can access the FollowMyHealth Patient Portal offered by [facility] by registering at the following website: [email or web address]. By joining [facility]’s FollowMyHealth portal, you will also be able to view your health information using other applications (apps) compatible with our system.

## 2023-10-04 NOTE — PROGRESS NOTE ADULT - REASON FOR ADMISSION
Cellulitis with failure of outpatient treatment

## 2023-10-04 NOTE — PROGRESS NOTE ADULT - ASSESSMENT
#Cellulitis- right leg   - cont IV abx - vanco cefepime     #Unsteady gait  Secondary to pain from lower extremity cellulitis  Ultrasound negative for DVT  Fall precautions, cont OOB, walking with RW     #Diabetes type 2 non-insulin-dependent with hyperglycemia  -insulin    #CAD/hypertension/CVA/hyperlipidemia  DASH/TLC diet  Continue Plavix  Continue Coreg  Continue Lipitor  Continue losartan    #Gout  Continue allopurinol    #GERD  Continue with famotidine    #VTE Px     #D/C plan             
74 y/o male with h/o hypertension, diabetes type 2 non-insulin-dependent, hyperlipidemia, stroke, STEMI, gout, AKOSUA was admitted on 9/29 from the wound care clinic for increased, nonhealing right foot and leg erythema. He was sent for IV antibiotics after failure with outpatient treatment on Keflex and is currently on third day of doxycycline. Patient states that he has been seeing Dr. Sullivan and wound care, states wound originally improved however has been worsening over past week with enlargement of redness and swelling. Patient endorses increased swelling, pain, redness to right lower extremity. In ER he received cefepime and vancomycin IV.     1. Right lower leg cellulitis. Probable underlying chronic stasis dermatitis. DM type 2.   -multiple skin breaks open  -BC x 2, urine c/s noted  -on vancomycin 750 mg IV q12h and cefepime 1 gm IV q8h # 4  -tolerating abx well so far; no side effects noted  -vancomycin trough level is therapeutic  -elevate legs  -avoid scratching legs  -continue abx coverage   -monitor temps  -f/u CBC  -supportive care  2. Other issues:   -care per medicine    
A: 72y/o Male seen for the following:   -Right lower extremity cellulitis  -Stasis dermitis  -Difficulty with ambulation     P:   Chart reviewed and Patient evaluated;  Discussed diagnosis and treatment with patient. Discussed importance of daily foot examinations, proper shoe gear, importance of tight glycemic control.   X-rays tib-fib reviewed : on wet read showing no soft tissue gas, no acute cortical disruptions. Official impression noted above.   Wound flush with normal saline  No acute podiatric intervention. Applied bacitracin to few scabs and small superficial skin breakage, stable. No dressings required at this time. Erythema appears to be receding.  Pt to follow at  wound care center after stable for DC for continued care of right lower extremity   WBAT to BLE  Continue with IV antibiotics as per ID  All additional care per Med appreciated  Patient demonstrated verbal understanding of all interventions and tolerated interventions well without any complications.   Podiatry will follow while in house      Case D/W attending Dr. Sullivan
A: 72y/o Male seen for the following:   -Right lower extremity cellulitis  -Stasis dermitis  -Difficulty with ambulation     P:   Chart reviewed and Patient evaluated;  Discussed diagnosis and treatment with patient. Discussed importance of daily foot examinations, proper shoe gear, importance of tight glycemic control.   X-rays tib-fib reviewed : on wet read showing no soft tissue gas, no acute cortical disruptions. Official impression noted above.   Wound flush with normal saline  No acute podiatric intervention. Applied bacitracin to few scabs and small superficial skin breakage, stable. No dressings required at this time. Erythema appears to be receding.  Pt to follow at  wound care center after stable for DC for continued care of right lower extremity   WBAT to BLE  Continue with IV antibiotics as per ID  All additional care per Med appreciated  Patient demonstrated verbal understanding of all interventions and tolerated interventions well without any complications.   Podiatry will follow while in house      Case D/W attending Dr. Sullivan
      Patient is a 73-year-old male who presents to ED for admission due to:    #Cellulitis- right leg   -iv abx, supportive care     #Unsteady gait  Secondary to pain from lower extremity cellulitis  Ultrasound negative for DVT  Fall precautions    #Diabetes type 2 non-insulin-dependent with hyperglycemia  -insulin      #CAD/hypertension/CVA/hyperlipidemia  DASH/TLC diet  Continue Plavix  Continue Coreg  Continue Lipitor  Continue losartan    #Gout  Continue allopurinol    #GERD  Continue with famotidine    #DVT pr     #Above discussed with pt and all questions have been answered         
72 y/o male with h/o hypertension, diabetes type 2 non-insulin-dependent, hyperlipidemia, stroke, STEMI, gout, AKOSUA was admitted on 9/29 from the wound care clinic for increased, nonhealing right foot and leg erythema. He was sent for IV antibiotics after failure with outpatient treatment on Keflex and is currently on third day of doxycycline. Patient states that he has been seeing Dr. Sullivan and wound care, states wound originally improved however has been worsening over past week with enlargement of redness and swelling. Patient endorses increased swelling, pain, redness to right lower extremity. In ER he received cefepime and vancomycin IV.     1. Right lower leg cellulitis. Probable underlying chronic stasis dermatitis. DM type 2.   -multiple skin breaks - closing  -BC x 2, urine c/s noted  -on vancomycin 750 mg IV q12h and cefepime 1 gm IV q8h # 5  -tolerating abx well so far; no side effects noted  -vancomycin trough level is therapeutic  -elevate legs  -avoid scratching legs  -continue abx coverage, plan to change to oral abx coverage soon  -monitor temps  -f/u CBC  -supportive care  2. Other issues:   -care per medicine    
72 y/o male with h/o hypertension, diabetes type 2 non-insulin-dependent, hyperlipidemia, stroke, STEMI, gout, AKOSUA was admitted on 9/29 from the wound care clinic for increased, nonhealing right foot and leg erythema. He was sent for IV antibiotics after failure with outpatient treatment on Keflex and is currently on third day of doxycycline. Patient states that he has been seeing Dr. Sullivan and wound care, states wound originally improved however has been worsening over past week with enlargement of redness and swelling. Patient endorses increased swelling, pain, redness to right lower extremity. In ER he received cefepime and vancomycin IV.     1. Right lower leg cellulitis. Probable underlying chronic stasis dermatitis. DM type 2.   -multiple skin breaks open  -BC x 2, urine c/s noted  -on vancomycin 750 mg IV q12h and cefepime 1 gm IV q8h # 2  -tolerating abx well so far; no side effects noted  -obtain vancomycin trough level  -elevate legs  -continue abx coverage   -monitor temps  -f/u CBC  -supportive care  2. Other issues:   -care per medicine    
72 y/o male with h/o hypertension, diabetes type 2 non-insulin-dependent, hyperlipidemia, stroke, STEMI, gout, AKOSUA was admitted on 9/29 from the wound care clinic for increased, nonhealing right foot and leg erythema. He was sent for IV antibiotics after failure with outpatient treatment on Keflex and is currently on third day of doxycycline. Patient states that he has been seeing Dr. Sullivan and wound care, states wound originally improved however has been worsening over past week with enlargement of redness and swelling. Patient endorses increased swelling, pain, redness to right lower extremity. In ER he received cefepime and vancomycin IV.     1. Right lower leg cellulitis. Probable underlying chronic stasis dermatitis. DM type 2.   -multiple skin breaks open  -BC x 2, urine c/s noted  -on vancomycin 750 mg IV q12h and cefepime 1 gm IV q8h # 3  -tolerating abx well so far; no side effects noted  -vancomycin trough level is therapeutic  -elevate legs  -avoid scratching legs  -continue abx coverage   -monitor temps  -f/u CBC  -supportive care  2. Other issues:   -care per medicine    
Patient is a 73-year-old male who presents to ED for admission due to:    #Cellulitis- right leg   - cont IV abx - vanco cefepime   ID consult , AM labs     #Unsteady gait  Secondary to pain from lower extremity cellulitis  Ultrasound negative for DVT  Fall precautions, cont OOB, walking with RW     #Diabetes type 2 non-insulin-dependent with hyperglycemia  -insulin    #CAD/hypertension/CVA/hyperlipidemia  DASH/TLC diet  Continue Plavix  Continue Coreg  Continue Lipitor  Continue losartan    #Gout  Continue allopurinol    #GERD  Continue with famotidine    #VTE Px             
      Patient is a 73-year-old male who presents to ED for admission due to:    #Cellulitis- right leg   -iv abx, day#2  ID consult     #Unsteady gait  Secondary to pain from lower extremity cellulitis  Ultrasound negative for DVT  Fall precautions    #Diabetes type 2 non-insulin-dependent with hyperglycemia  -insulin      #CAD/hypertension/CVA/hyperlipidemia  DASH/TLC diet  Continue Plavix  Continue Coreg  Continue Lipitor  Continue losartan    #Gout  Continue allopurinol    #GERD  Continue with famotidine    #DVT pr     #Above discussed with pt and all questions have been answered         
Patient is a 73-year-old male who presents to ED for admission due to:    #Cellulitis- right leg   - cont IV abx - vanco cefepime   ID consult , AM labs     #Unsteady gait  Secondary to pain from lower extremity cellulitis  Ultrasound negative for DVT  Fall precautions, cont OOB, walking with RW     #Diabetes type 2 non-insulin-dependent with hyperglycemia  -insulin    #CAD/hypertension/CVA/hyperlipidemia  DASH/TLC diet  Continue Plavix  Continue Coreg  Continue Lipitor  Continue losartan    #Gout  Continue allopurinol    #GERD  Continue with famotidine    #VTE Px     DISPO - home in 2 days

## 2023-10-04 NOTE — DISCHARGE NOTE PROVIDER - PROVIDER TOKENS
PROVIDER:[TOKEN:[[id number]],FOLLOWUP:[1 week]],PROVIDER:[TOKEN:[[id number]],FOLLOWUP:[1 week]]

## 2023-10-05 LAB
CULTURE RESULTS: SIGNIFICANT CHANGE UP
CULTURE RESULTS: SIGNIFICANT CHANGE UP
SPECIMEN SOURCE: SIGNIFICANT CHANGE UP
SPECIMEN SOURCE: SIGNIFICANT CHANGE UP

## 2023-10-09 DIAGNOSIS — M10.9 GOUT, UNSPECIFIED: ICD-10-CM

## 2023-10-09 DIAGNOSIS — K21.9 GASTRO-ESOPHAGEAL REFLUX DISEASE WITHOUT ESOPHAGITIS: ICD-10-CM

## 2023-10-09 DIAGNOSIS — I10 ESSENTIAL (PRIMARY) HYPERTENSION: ICD-10-CM

## 2023-10-09 DIAGNOSIS — Z79.82 LONG TERM (CURRENT) USE OF ASPIRIN: ICD-10-CM

## 2023-10-09 DIAGNOSIS — E78.5 HYPERLIPIDEMIA, UNSPECIFIED: ICD-10-CM

## 2023-10-09 DIAGNOSIS — Z86.73 PERSONAL HISTORY OF TRANSIENT ISCHEMIC ATTACK (TIA), AND CEREBRAL INFARCTION WITHOUT RESIDUAL DEFICITS: ICD-10-CM

## 2023-10-09 DIAGNOSIS — F32.A DEPRESSION, UNSPECIFIED: ICD-10-CM

## 2023-10-09 DIAGNOSIS — L03.115 CELLULITIS OF RIGHT LOWER LIMB: ICD-10-CM

## 2023-10-09 DIAGNOSIS — E66.9 OBESITY, UNSPECIFIED: ICD-10-CM

## 2023-10-09 DIAGNOSIS — G47.33 OBSTRUCTIVE SLEEP APNEA (ADULT) (PEDIATRIC): ICD-10-CM

## 2023-10-09 DIAGNOSIS — Z79.84 LONG TERM (CURRENT) USE OF ORAL HYPOGLYCEMIC DRUGS: ICD-10-CM

## 2023-10-09 DIAGNOSIS — I87.2 VENOUS INSUFFICIENCY (CHRONIC) (PERIPHERAL): ICD-10-CM

## 2023-10-09 DIAGNOSIS — I25.2 OLD MYOCARDIAL INFARCTION: ICD-10-CM

## 2023-10-09 DIAGNOSIS — E11.65 TYPE 2 DIABETES MELLITUS WITH HYPERGLYCEMIA: ICD-10-CM

## 2023-10-09 DIAGNOSIS — I25.10 ATHEROSCLEROTIC HEART DISEASE OF NATIVE CORONARY ARTERY WITHOUT ANGINA PECTORIS: ICD-10-CM

## 2023-10-09 DIAGNOSIS — Z88.8 ALLERGY STATUS TO OTHER DRUGS, MEDICAMENTS AND BIOLOGICAL SUBSTANCES: ICD-10-CM

## 2023-10-09 DIAGNOSIS — R26.0 ATAXIC GAIT: ICD-10-CM

## 2023-10-13 DIAGNOSIS — E11.40 TYPE 2 DIABETES MELLITUS WITH DIABETIC NEUROPATHY, UNSPECIFIED: ICD-10-CM

## 2023-10-13 DIAGNOSIS — L03.115 CELLULITIS OF RIGHT LOWER LIMB: ICD-10-CM

## 2023-10-13 DIAGNOSIS — Z79.4 LONG TERM (CURRENT) USE OF INSULIN: ICD-10-CM

## 2023-10-13 DIAGNOSIS — R60.9 EDEMA, UNSPECIFIED: ICD-10-CM

## 2023-10-13 DIAGNOSIS — I50.9 HEART FAILURE, UNSPECIFIED: ICD-10-CM

## 2023-10-13 DIAGNOSIS — I11.0 HYPERTENSIVE HEART DISEASE WITH HEART FAILURE: ICD-10-CM

## 2023-10-20 ENCOUNTER — OUTPATIENT (OUTPATIENT)
Dept: OUTPATIENT SERVICES | Facility: HOSPITAL | Age: 73
LOS: 1 days | End: 2023-10-20
Payer: MEDICARE

## 2023-10-20 DIAGNOSIS — R60.9 EDEMA, UNSPECIFIED: ICD-10-CM

## 2023-10-20 DIAGNOSIS — E11.40 TYPE 2 DIABETES MELLITUS WITH DIABETIC NEUROPATHY, UNSPECIFIED: ICD-10-CM

## 2023-10-20 DIAGNOSIS — I11.0 HYPERTENSIVE HEART DISEASE WITH HEART FAILURE: ICD-10-CM

## 2023-10-20 DIAGNOSIS — L97.811 NON-PRESSURE CHRONIC ULCER OF OTHER PART OF RIGHT LOWER LEG LIMITED TO BREAKDOWN OF SKIN: ICD-10-CM

## 2023-10-20 DIAGNOSIS — I50.9 HEART FAILURE, UNSPECIFIED: ICD-10-CM

## 2023-10-20 DIAGNOSIS — L03.116 CELLULITIS OF LEFT LOWER LIMB: ICD-10-CM

## 2023-10-20 DIAGNOSIS — Z79.4 LONG TERM (CURRENT) USE OF INSULIN: ICD-10-CM

## 2023-10-20 PROCEDURE — 99213 OFFICE O/P EST LOW 20 MIN: CPT

## 2023-11-03 ENCOUNTER — OUTPATIENT (OUTPATIENT)
Dept: OUTPATIENT SERVICES | Facility: HOSPITAL | Age: 73
LOS: 1 days | End: 2023-11-03
Payer: MEDICARE

## 2023-11-03 DIAGNOSIS — Z79.4 LONG TERM (CURRENT) USE OF INSULIN: ICD-10-CM

## 2023-11-03 DIAGNOSIS — L97.811 NON-PRESSURE CHRONIC ULCER OF OTHER PART OF RIGHT LOWER LEG LIMITED TO BREAKDOWN OF SKIN: ICD-10-CM

## 2023-11-03 DIAGNOSIS — R60.9 EDEMA, UNSPECIFIED: ICD-10-CM

## 2023-11-03 DIAGNOSIS — E11.40 TYPE 2 DIABETES MELLITUS WITH DIABETIC NEUROPATHY, UNSPECIFIED: ICD-10-CM

## 2023-11-03 DIAGNOSIS — L03.116 CELLULITIS OF LEFT LOWER LIMB: ICD-10-CM

## 2023-11-03 DIAGNOSIS — I11.0 HYPERTENSIVE HEART DISEASE WITH HEART FAILURE: ICD-10-CM

## 2023-11-03 DIAGNOSIS — I50.9 HEART FAILURE, UNSPECIFIED: ICD-10-CM

## 2023-11-03 PROCEDURE — 99213 OFFICE O/P EST LOW 20 MIN: CPT

## 2023-12-07 ENCOUNTER — RX RENEWAL (OUTPATIENT)
Age: 73
End: 2023-12-07

## 2023-12-07 RX ORDER — ESCITALOPRAM OXALATE 10 MG/1
10 TABLET ORAL
Qty: 90 | Refills: 0 | Status: ACTIVE | COMMUNITY
Start: 2023-12-07 | End: 1900-01-01
